# Patient Record
Sex: FEMALE | Race: WHITE | NOT HISPANIC OR LATINO | Employment: FULL TIME | RURAL
[De-identification: names, ages, dates, MRNs, and addresses within clinical notes are randomized per-mention and may not be internally consistent; named-entity substitution may affect disease eponyms.]

---

## 2017-12-09 ENCOUNTER — CONVERSION ENCOUNTER (OUTPATIENT)
Dept: FAMILY MEDICINE CLINIC | Facility: CLINIC | Age: 47
End: 2017-12-09

## 2017-12-10 LAB
ALBUMIN SERPL-MCNC: 4.4 G/DL (ref 3.6–5.1)
ALP SERPL-CCNC: 64 U/L (ref 33–115)
ALT SERPL-CCNC: 14 U/L (ref 6–29)
AST SERPL-CCNC: 19 U/L (ref 10–35)
BASOPHILS # BLD AUTO: 122 CELLS/UL (ref 0–200)
BASOPHILS NFR BLD AUTO: 1.2 %
BILIRUB SERPL-MCNC: 0.4 MG/DL (ref 0.2–1.2)
BUN SERPL-MCNC: 14 MG/DL (ref 7–25)
BUN/CREAT SERPL: 12 (CALC) (ref 6–22)
CALCIUM SERPL-MCNC: 9.9 MG/DL (ref 8.6–10.2)
CHLORIDE SERPL-SCNC: 104 MMOL/L (ref 98–110)
CHOLEST SERPL-MCNC: 196 MG/DL
CHOLEST/HDLC SERPL: 4.5 (CALC)
CONV CO2: 26 MMOL/L (ref 20–31)
CONV TOTAL PROTEIN: 8.1 G/DL (ref 6.1–8.1)
CREAT UR-MCNC: 1.18 MG/DL (ref 0.5–1.1)
EOSINOPHIL # BLD AUTO: 3.3 %
EOSINOPHIL # BLD AUTO: 337 CELLS/UL (ref 15–500)
ERYTHROCYTE [DISTWIDTH] IN BLOOD BY AUTOMATED COUNT: 14.6 % (ref 11–15)
GLOBULIN UR ELPH-MCNC: 3.7 MG/DL (ref 1.9–3.7)
GLUCOSE UR QL: 97 MG/DL (ref 65–99)
HCT VFR BLD AUTO: 38.2 % (ref 35–45)
HDLC SERPL-MCNC: 44 MG/DL
HGB BLD-MCNC: 12.3 G/DL (ref 11.7–15.5)
INSULIN SERPL-ACNC: 1.2 (CALC) (ref 1–2.5)
LDLC SERPL CALC-MCNC: 122 MG/DL
LYMPHOCYTES # BLD AUTO: 3295 CELLS/UL (ref 850–3900)
LYMPHOCYTES NFR BLD AUTO: 32.3 %
MCH RBC QN AUTO: 27.1 PG (ref 27–33)
MCHC RBC AUTO-ENTMCNC: 32.2 G/DL (ref 32–36)
MCV RBC AUTO: 84.1 FL (ref 80–100)
MONOCYTES # BLD AUTO: 908 CELLS/UL (ref 200–950)
MONOCYTES NFR BLD AUTO: 8.9 %
NEUTROPHILS # BLD AUTO: 5539 CELLS/UL (ref 1500–7800)
NEUTROPHILS NFR BLD AUTO: 54.3 %
NONHDLC SERPL-MCNC: 152 MG/DL
PLATELET # BLD AUTO: 409 10*3/UL (ref 140–400)
PMV BLD AUTO: 10.7 FL (ref 7.5–12.5)
POTASSIUM SERPL-SCNC: 4.8 MMOL/L (ref 3.5–5.3)
RBC # BLD AUTO: 4.54 MILLION/UL (ref 3.8–5.1)
SODIUM SERPL-SCNC: 138 MMOL/L (ref 135–146)
TRIGL SERPL-MCNC: 181 MG/DL
TSH SERPL-ACNC: 1.93 MIU/L
WBC # BLD AUTO: 10.2 10*3/UL (ref 3.8–10.8)

## 2018-02-17 ENCOUNTER — CONVERSION ENCOUNTER (OUTPATIENT)
Dept: FAMILY MEDICINE CLINIC | Facility: CLINIC | Age: 48
End: 2018-02-17

## 2018-02-18 LAB
ALBUMIN SERPL-MCNC: 4.2 G/DL (ref 3.6–5.1)
ALP SERPL-CCNC: 69 U/L (ref 33–115)
ALT SERPL-CCNC: 14 U/L (ref 6–29)
AST SERPL-CCNC: 15 U/L (ref 10–35)
BASOPHILS # BLD AUTO: 134 CELLS/UL (ref 0–200)
BASOPHILS NFR BLD AUTO: 1.2 %
BILIRUB SERPL-MCNC: 0.3 MG/DL (ref 0.2–1.2)
BUN SERPL-MCNC: 11 MG/DL (ref 7–25)
BUN/CREAT SERPL: NORMAL (CALC) (ref 6–22)
CALCIUM SERPL-MCNC: 9.7 MG/DL (ref 8.6–10.2)
CHLORIDE SERPL-SCNC: 103 MMOL/L (ref 98–110)
CONV CO2: 25 MMOL/L (ref 20–31)
CONV TOTAL PROTEIN: 7.8 G/DL (ref 6.1–8.1)
CREAT UR-MCNC: 0.91 MG/DL (ref 0.5–1.1)
EOSINOPHIL # BLD AUTO: 7.9 %
EOSINOPHIL # BLD AUTO: 885 CELLS/UL (ref 15–500)
ERYTHROCYTE [DISTWIDTH] IN BLOOD BY AUTOMATED COUNT: 14.7 % (ref 11–15)
GLOBULIN UR ELPH-MCNC: 3.6 MG/DL (ref 1.9–3.7)
GLUCOSE UR QL: 96 MG/DL (ref 65–99)
HCT VFR BLD AUTO: 38.6 % (ref 35–45)
HGB BLD-MCNC: 12.4 G/DL (ref 11.7–15.5)
INSULIN SERPL-ACNC: 1.2 (CALC) (ref 1–2.5)
LYMPHOCYTES # BLD AUTO: 4368 CELLS/UL (ref 850–3900)
LYMPHOCYTES NFR BLD AUTO: 39 %
MCH RBC QN AUTO: 27.3 PG (ref 27–33)
MCHC RBC AUTO-ENTMCNC: 32.1 G/DL (ref 32–36)
MCV RBC AUTO: 84.8 FL (ref 80–100)
MONOCYTES # BLD AUTO: 862 CELLS/UL (ref 200–950)
MONOCYTES NFR BLD AUTO: 7.7 %
NEUTROPHILS # BLD AUTO: 4950 CELLS/UL (ref 1500–7800)
NEUTROPHILS NFR BLD AUTO: 44.2 %
PLATELET # BLD AUTO: 377 10*3/UL (ref 140–400)
PMV BLD AUTO: 10.2 FL (ref 7.5–12.5)
POTASSIUM SERPL-SCNC: 4.4 MMOL/L (ref 3.5–5.3)
RBC # BLD AUTO: 4.55 MILLION/UL (ref 3.8–5.1)
SODIUM SERPL-SCNC: 138 MMOL/L (ref 135–146)
WBC # BLD AUTO: 11.2 10*3/UL (ref 3.8–10.8)

## 2019-03-23 ENCOUNTER — CONVERSION ENCOUNTER (OUTPATIENT)
Dept: FAMILY MEDICINE CLINIC | Facility: CLINIC | Age: 49
End: 2019-03-23

## 2019-03-23 LAB
BASOPHILS # BLD AUTO: 127 CELLS/UL (ref 0–200)
BASOPHILS NFR BLD AUTO: 1.3 %
CHOLEST SERPL-MCNC: 180 MG/DL
CHOLEST/HDLC SERPL: 4.2 (CALC)
EOSINOPHIL # BLD AUTO: 3.6 %
EOSINOPHIL # BLD AUTO: 353 CELLS/UL (ref 15–500)
ERYTHROCYTE [DISTWIDTH] IN BLOOD BY AUTOMATED COUNT: 13.6 % (ref 11–15)
HCT VFR BLD AUTO: 38.2 % (ref 35–45)
HDLC SERPL-MCNC: 43 MG/DL
HGB BLD-MCNC: 12.3 G/DL (ref 11.7–15.5)
LDLC SERPL CALC-MCNC: 104 MG/DL
LYMPHOCYTES # BLD AUTO: 4665 CELLS/UL (ref 850–3900)
LYMPHOCYTES NFR BLD AUTO: 47.6 %
MCH RBC QN AUTO: 28.1 PG (ref 27–33)
MCHC RBC AUTO-ENTMCNC: 32.2 G/DL (ref 32–36)
MCV RBC AUTO: 87.4 FL (ref 80–100)
MONOCYTES # BLD AUTO: 696 CELLS/UL (ref 200–950)
MONOCYTES NFR BLD AUTO: 7.1 %
NEUTROPHILS # BLD AUTO: 3959 CELLS/UL (ref 1500–7800)
NEUTROPHILS NFR BLD AUTO: 40.4 %
NONHDLC SERPL-MCNC: 137 MG/DL
PLATELET # BLD AUTO: 380 10*3/UL (ref 140–400)
PMV BLD AUTO: 10.4 FL (ref 7.5–12.5)
RBC # BLD AUTO: 4.37 MILLION/UL (ref 3.8–5.1)
TRIGL SERPL-MCNC: 209 MG/DL
WBC # BLD AUTO: 9.8 10*3/UL (ref 3.8–10.8)

## 2020-01-10 ENCOUNTER — OFFICE VISIT (OUTPATIENT)
Dept: FAMILY MEDICINE CLINIC | Facility: CLINIC | Age: 50
End: 2020-01-10

## 2020-01-10 VITALS
RESPIRATION RATE: 18 BRPM | HEART RATE: 86 BPM | OXYGEN SATURATION: 98 % | HEIGHT: 64 IN | DIASTOLIC BLOOD PRESSURE: 62 MMHG | SYSTOLIC BLOOD PRESSURE: 108 MMHG | WEIGHT: 168.2 LBS | TEMPERATURE: 98.5 F | BODY MASS INDEX: 28.71 KG/M2

## 2020-01-10 DIAGNOSIS — E66.3 OVERWEIGHT (BMI 25.0-29.9): ICD-10-CM

## 2020-01-10 DIAGNOSIS — Z12.4 CERVICAL CANCER SCREENING: ICD-10-CM

## 2020-01-10 DIAGNOSIS — Z12.39 ENCOUNTER FOR SCREENING FOR MALIGNANT NEOPLASM OF BREAST: ICD-10-CM

## 2020-01-10 DIAGNOSIS — J30.1 CHRONIC SEASONAL ALLERGIC RHINITIS DUE TO POLLEN: ICD-10-CM

## 2020-01-10 DIAGNOSIS — Z78.0 ASYMPTOMATIC MENOPAUSAL STATE: ICD-10-CM

## 2020-01-10 DIAGNOSIS — Z12.11 COLON CANCER SCREENING: ICD-10-CM

## 2020-01-10 DIAGNOSIS — I10 HYPERTENSION, BENIGN: ICD-10-CM

## 2020-01-10 DIAGNOSIS — Z00.01 ANNUAL VISIT FOR GENERAL ADULT MEDICAL EXAMINATION WITH ABNORMAL FINDINGS: Primary | ICD-10-CM

## 2020-01-10 DIAGNOSIS — E78.5 DYSLIPIDEMIA WITH ELEVATED LOW DENSITY LIPOPROTEIN (LDL) CHOLESTEROL AND ABNORMALLY LOW HIGH DENSITY LIPOPROTEIN CHOLESTEROL: ICD-10-CM

## 2020-01-10 DIAGNOSIS — R92.2 DENSE BREAST: ICD-10-CM

## 2020-01-10 DIAGNOSIS — R35.0 URINARY FREQUENCY: ICD-10-CM

## 2020-01-10 DIAGNOSIS — J45.20 MILD INTERMITTENT ASTHMA WITHOUT COMPLICATION: ICD-10-CM

## 2020-01-10 DIAGNOSIS — K21.9 GASTROESOPHAGEAL REFLUX DISEASE WITHOUT ESOPHAGITIS: ICD-10-CM

## 2020-01-10 LAB
BILIRUB BLD-MCNC: NEGATIVE MG/DL
CLARITY, POC: ABNORMAL
COLOR UR: ABNORMAL
GLUCOSE UR STRIP-MCNC: NEGATIVE MG/DL
KETONES UR QL: NEGATIVE
LEUKOCYTE EST, POC: ABNORMAL
NITRITE UR-MCNC: POSITIVE MG/ML
PH UR: 7 [PH] (ref 5–8)
PROT UR STRIP-MCNC: ABNORMAL MG/DL
RBC # UR STRIP: ABNORMAL /UL
SP GR UR: 1.02 (ref 1–1.03)
UROBILINOGEN UR QL: NORMAL

## 2020-01-10 PROCEDURE — 99396 PREV VISIT EST AGE 40-64: CPT | Performed by: FAMILY MEDICINE

## 2020-01-10 PROCEDURE — 81002 URINALYSIS NONAUTO W/O SCOPE: CPT | Performed by: FAMILY MEDICINE

## 2020-01-10 PROCEDURE — 99213 OFFICE O/P EST LOW 20 MIN: CPT | Performed by: FAMILY MEDICINE

## 2020-01-10 RX ORDER — CHLORAL HYDRATE 500 MG
1 CAPSULE ORAL 2 TIMES DAILY
COMMUNITY

## 2020-01-10 RX ORDER — MONTELUKAST SODIUM 10 MG/1
1 TABLET ORAL DAILY
COMMUNITY
Start: 2019-12-01 | End: 2020-01-10 | Stop reason: SDUPTHER

## 2020-01-10 RX ORDER — LORATADINE 10 MG/1
10 TABLET, ORALLY DISINTEGRATING ORAL DAILY
COMMUNITY

## 2020-01-10 RX ORDER — MONTELUKAST SODIUM 10 MG/1
10 TABLET ORAL DAILY
Qty: 90 TABLET | Refills: 3 | Status: SHIPPED | OUTPATIENT
Start: 2020-01-10 | End: 2021-02-15

## 2020-01-10 RX ORDER — SULFAMETHOXAZOLE AND TRIMETHOPRIM 800; 160 MG/1; MG/1
1 TABLET ORAL 2 TIMES DAILY
Qty: 20 TABLET | Refills: 0 | Status: SHIPPED | OUTPATIENT
Start: 2020-01-10 | End: 2020-03-04

## 2020-01-10 RX ORDER — ATENOLOL 25 MG/1
25 TABLET ORAL 2 TIMES DAILY
Qty: 90 TABLET | Refills: 3 | Status: SHIPPED | OUTPATIENT
Start: 2020-01-10 | End: 2020-08-24

## 2020-01-10 RX ORDER — OMEPRAZOLE 20 MG/1
20 CAPSULE, DELAYED RELEASE ORAL DAILY
Qty: 90 CAPSULE | Refills: 3 | Status: SHIPPED | OUTPATIENT
Start: 2020-01-10 | End: 2021-02-15

## 2020-01-10 RX ORDER — ASPIRIN 81 MG/1
81 TABLET ORAL DAILY
COMMUNITY

## 2020-01-10 RX ORDER — ALBUTEROL SULFATE 90 UG/1
2 AEROSOL, METERED RESPIRATORY (INHALATION) AS NEEDED
COMMUNITY
End: 2021-01-27 | Stop reason: SDUPTHER

## 2020-01-10 RX ORDER — ATENOLOL 25 MG/1
1 TABLET ORAL 2 TIMES DAILY
COMMUNITY
Start: 2019-12-01 | End: 2020-01-10 | Stop reason: SDUPTHER

## 2020-01-10 RX ORDER — OMEPRAZOLE 20 MG/1
1 CAPSULE, DELAYED RELEASE ORAL DAILY
COMMUNITY
Start: 2019-12-01 | End: 2020-01-10 | Stop reason: SDUPTHER

## 2020-01-10 NOTE — PROGRESS NOTES
Subjective   Nancy Marcano is a 49 y.o. female.     Chief Complaint   Patient presents with   • Annual Exam   • Urinary Tract Infection   • Hypertension   • Hyperlipidemia       The patient is here: for coordination of medical care to discuss health maintenance and disease prevention. Overall has: moderate activity with work/home activities, good appetite, feels well with no complaints, good energy level and is sleeping well. Nutrition: balanced diet. Last tetanus shot was 2016.     Urinary Frequency    This is a new problem. The current episode started in the past 7 days. The problem occurs every urination. The problem has been gradually worsening. The quality of the pain is described as aching. There has been no fever. Associated symptoms include frequency and urgency. Pertinent negatives include no chills, discharge, flank pain, nausea or vomiting. She has tried nothing for the symptoms. Her past medical history is significant for kidney stones.   Hypertension   This is a chronic problem. The current episode started more than 1 year ago. The problem is controlled. Pertinent negatives include no blurred vision, chest pain, headaches, palpitations or shortness of breath. Risk factors for coronary artery disease include dyslipidemia and family history. Current antihypertension treatment includes beta blockers. The current treatment provides significant improvement.   Hyperlipidemia   This is a chronic problem. The current episode started more than 1 year ago. The problem is uncontrolled. Recent lipid tests were reviewed and are high. She has no history of diabetes or obesity. Pertinent negatives include no chest pain, myalgias or shortness of breath. She is currently on no antihyperlipidemic treatment. Risk factors for coronary artery disease include dyslipidemia, hypertension and family history.        I personally reviewed and updated the patient's allergies, medications, problem list, and past medical,  surgical, social, and family history.     Allergies:  No Known Allergies    Social History:  Social History     Socioeconomic History   • Marital status:      Spouse name: Not on file   • Number of children: Not on file   • Years of education: Not on file   • Highest education level: Not on file   Tobacco Use   • Smoking status: Never Smoker   • Smokeless tobacco: Never Used   Substance and Sexual Activity   • Alcohol use: Not Currently   • Drug use: Not Currently   • Sexual activity: Defer       Family History:  Family History   Problem Relation Age of Onset   • Heart disease Mother         Cardiovascular    • Hyperlipidemia Mother    • Lupus Mother         Lupus anticoagulant disorder   • Heart disease Father         Cardiovascular    • Hyperlipidemia Father    • Diabetes Paternal Grandmother    • Diabetes Paternal Aunt        Past Medical History :  Patient Active Problem List   Diagnosis   • Chronic seasonal allergic rhinitis due to pollen   • Dense breast   • Dyslipidemia with elevated low density lipoprotein (LDL) cholesterol and abnormally low high density lipoprotein cholesterol   • Gastroesophageal reflux disease without esophagitis   • Hypertension, benign   • Menopausal syndrome   • Mild intermittent asthma without complication   • Overweight (BMI 25.0-29.9)   • Annual visit for general adult medical examination with abnormal findings   • Encounter for screening for malignant neoplasm of breast   • Cervical cancer screening   • Asymptomatic menopausal state   • Colon cancer screening       Medication List:    Current Outpatient Medications:   •  albuterol sulfate  (90 Base) MCG/ACT inhaler, Inhale 2 puffs As Needed., Disp: , Rfl:   •  aspirin 81 MG EC tablet, Take 81 mg by mouth Daily., Disp: , Rfl:   •  atenolol (TENORMIN) 25 MG tablet, Take 1 tablet by mouth 2 (Two) Times a Day., Disp: 90 tablet, Rfl: 3  •  loratadine (CLARITIN REDITABS) 10 MG disintegrating tablet, Take 10 mg by mouth  Daily., Disp: , Rfl:   •  montelukast (SINGULAIR) 10 MG tablet, Take 1 tablet by mouth Daily., Disp: 90 tablet, Rfl: 3  •  Omega-3 Fatty Acids (FISH OIL) 1000 MG capsule capsule, Take 1 capsule by mouth 2 (Two) Times a Day., Disp: , Rfl:   •  omeprazole (priLOSEC) 20 MG capsule, Take 1 capsule by mouth Daily., Disp: 90 capsule, Rfl: 3  •  sulfamethoxazole-trimethoprim (BACTRIM DS,SEPTRA DS) 800-160 MG per tablet, Take 1 tablet by mouth 2 (Two) Times a Day., Disp: 20 tablet, Rfl: 0    Past Surgical History:  Past Surgical History:   Procedure Laterality Date   • CATARACT EXTRACTION Left 08/28/2017    [facility]   • CATARACT EXTRACTION Right 2018    [facility]   • KIDNEY SURGERY      Lithotripsy       Depression Screen:   PHQ-2/PHQ-9 Depression Screening 1/10/2020   Little interest or pleasure in doing things 0   Feeling down, depressed, or hopeless 0   Total Score 0       Review Of Systems:  Review of Systems   Constitutional: Negative for activity change, appetite change, chills, fatigue, fever, unexpected weight gain and unexpected weight loss.   HENT: Negative for congestion, ear pain, hearing loss, rhinorrhea, sinus pressure, sore throat, swollen glands, trouble swallowing and voice change.    Eyes: Negative for blurred vision and visual disturbance.   Respiratory: Negative for apnea, cough, shortness of breath and wheezing.    Cardiovascular: Negative for chest pain and palpitations.   Gastrointestinal: Negative for abdominal pain, blood in stool, constipation, diarrhea, nausea, vomiting and indigestion.   Endocrine: Negative for cold intolerance, heat intolerance, polydipsia and polyuria.   Genitourinary: Positive for frequency and urgency. Negative for breast discharge, breast lump, breast pain, dysuria, flank pain, pelvic pain and vaginal bleeding.   Musculoskeletal: Negative for arthralgias, joint swelling and myalgias.   Skin: Negative for rash, skin lesions and bruise.   Allergic/Immunologic:  "Negative for environmental allergies and immunocompromised state.   Neurological: Negative for dizziness, syncope, weakness, numbness, headache and memory problem.   Hematological: Negative for adenopathy. Does not bruise/bleed easily.   Psychiatric/Behavioral: Negative for suicidal ideas and depressed mood. The patient is not nervous/anxious.        I have reviewed and confirmed the accuracy of the ROS as documented by the MA/LPN/RN Daly Galeano MD    Vital Signs:  Visit Vitals  /62 (BP Location: Right arm, Patient Position: Sitting, Cuff Size: Adult)   Pulse 86   Temp 98.5 °F (36.9 °C) (Oral)   Resp 18   Ht 162.6 cm (64\")   Wt 76.3 kg (168 lb 3.2 oz)   LMP 01/07/2020   SpO2 98% Comment: Room air   BMI 28.87 kg/m²       Physical Exam   Constitutional: She is oriented to person, place, and time. She appears well-developed and well-nourished. No distress.   HENT:   Head: Normocephalic. Hair is normal.   Right Ear: Tympanic membrane and external ear normal.   Left Ear: Tympanic membrane and external ear normal.   Nose: Nose normal. No mucosal edema.   Mouth/Throat: Uvula is midline, oropharynx is clear and moist and mucous membranes are normal.   Eyes: Pupils are equal, round, and reactive to light. Conjunctivae and EOM are normal. Right eye exhibits no discharge. Left eye exhibits no discharge.   Neck: Normal range of motion. Neck supple. No JVD present. No muscular tenderness present. No thyromegaly present.   Cardiovascular: Normal rate, regular rhythm and normal heart sounds. PMI is not displaced. Exam reveals no gallop and no friction rub.   No murmur heard.  Pulses:       Carotid pulses are 2+ on the right side, and 2+ on the left side.       Femoral pulses are 2+ on the right side, and 2+ on the left side.       Dorsalis pedis pulses are 2+ on the right side, and 2+ on the left side.   Pulmonary/Chest: Effort normal and breath sounds normal. No respiratory distress. She has no decreased breath " sounds. She has no wheezes. She has no rhonchi. She has no rales. Right breast exhibits no inverted nipple, no mass, no nipple discharge, no skin change and no tenderness. Left breast exhibits no inverted nipple, no mass, no nipple discharge, no skin change and no tenderness. No breast bleeding. Breasts are symmetrical.   Abdominal: Soft. Bowel sounds are normal. She exhibits no distension, no abdominal bruit and no mass. There is no hepatosplenomegaly. There is no CVA tenderness. Hernia confirmed negative in the right inguinal area and confirmed negative in the left inguinal area.   Musculoskeletal: Normal range of motion. She exhibits no edema, tenderness or deformity.   Lymphadenopathy:     She has no cervical adenopathy.        Right: No inguinal and no supraclavicular adenopathy present.        Left: No inguinal and no supraclavicular adenopathy present.   Neurological: She is alert and oriented to person, place, and time. She has normal strength and normal reflexes. No cranial nerve deficit or sensory deficit. She exhibits normal muscle tone. Coordination normal.   Skin: No ecchymosis, no lesion and no rash noted. No erythema.   Psychiatric: She has a normal mood and affect. Her speech is normal and behavior is normal. Judgment and thought content normal. Cognition and memory are normal. She does not express impulsivity. She expresses no suicidal ideation. She exhibits normal recent memory and normal remote memory.       Assessment and Plan:  Problem List Items Addressed This Visit        Unprioritized    Chronic seasonal allergic rhinitis due to pollen    Relevant Medications    montelukast (SINGULAIR) 10 MG tablet    Dense breast    Overview     Nancy Marcano's 5 year risk of having breast cancer is 0.9%. The average woman at age 49 y.o. has a risk of 1.3% of having breast cancer.  Nancy Marcano's life time risk of having breast cancer up to age 90 is 8.8%. The average woman's chance of  having breast cancer up to the age of 90 is 11.2%.    We discussed the risk assessment values with Nancy Marcano, she understands that she is at (less) than average risk of developing breast cancer at 5 years and over her life time than the average woman of her age. She does not desire to have genetic testing or further work up at this time.          Dyslipidemia with elevated low density lipoprotein (LDL) cholesterol and abnormally low high density lipoprotein cholesterol    Overview     Stable diet and exercise discussed.          Relevant Orders    Lipid Panel With / Chol / HDL Ratio (Completed)    Gastroesophageal reflux disease without esophagitis    Overview     Stable on meds.          Relevant Medications    omeprazole (priLOSEC) 20 MG capsule    Hypertension, benign    Overview     Controlled.          Current Assessment & Plan     Hypertension is improving with treatment.  Continue current treatment regimen.  Dietary sodium restriction.  Weight loss.  Regular aerobic exercise.  Blood pressure will be reassessed at the next regular appointment.         Relevant Medications    atenolol (TENORMIN) 25 MG tablet    Other Relevant Orders    CBC & Differential (Completed)    Comprehensive Metabolic Panel (Completed)    TSH (Completed)    Mild intermittent asthma without complication    Overview     Sxs are presently stable with prn meds.          Relevant Medications    albuterol sulfate  (90 Base) MCG/ACT inhaler    montelukast (SINGULAIR) 10 MG tablet    Overweight (BMI 25.0-29.9)    Overview     Diet exercise and wt loss encouraged.          Current Assessment & Plan     Obesity is improving with lifestyle modifications.  Discussed the patient's BMI.  The BMI is above average; BMI management plan is completed.  General weight loss/lifestyle modification strategies discussed (elicit support from others; identify saboteurs; non-food rewards, etc).  Informal exercise measures discussed, e.g.  taking stairs instead of elevator.  Regular aerobic exercise program discussed.         Annual visit for general adult medical examination with abnormal findings - Primary    Overview     Diet exercise wt loss, sunscreens seat belts, breast self exams and follow up in 1 yr or prn problems. Previous lab was discussed. We will notify her of today's lab.          Encounter for screening for malignant neoplasm of breast    Overview     Last mammogram 05/2019 Dr. Ryan Lagos         Cervical cancer screening    Overview     Last pap smear 05/2019 Dr. Davis         Asymptomatic menopausal state    Colon cancer screening    Overview     Accepted GSI packet           Other Visit Diagnoses     Urinary frequency        Relevant Orders    POCT urinalysis dipstick, manual (Completed)    Urine Culture - Urine, Urine, Clean Catch          An After Visit Summary and PPPS were given to the patient.

## 2020-01-11 LAB
ALBUMIN SERPL-MCNC: 4.4 G/DL (ref 3.5–5.5)
ALBUMIN/GLOB SERPL: 1.6 {RATIO} (ref 1.2–2.2)
ALP SERPL-CCNC: 97 IU/L (ref 39–117)
ALT SERPL-CCNC: 17 IU/L (ref 0–32)
AST SERPL-CCNC: 19 IU/L (ref 0–40)
BASOPHILS # BLD AUTO: 0.1 X10E3/UL (ref 0–0.2)
BASOPHILS NFR BLD AUTO: 1 %
BILIRUB SERPL-MCNC: 0.4 MG/DL (ref 0–1.2)
BUN SERPL-MCNC: 10 MG/DL (ref 6–24)
BUN/CREAT SERPL: 10 (ref 9–23)
CALCIUM SERPL-MCNC: 9.4 MG/DL (ref 8.7–10.2)
CHLORIDE SERPL-SCNC: 102 MMOL/L (ref 96–106)
CHOLEST SERPL-MCNC: 201 MG/DL (ref 100–199)
CHOLEST/HDLC SERPL: 4.2 RATIO (ref 0–4.4)
CO2 SERPL-SCNC: 24 MMOL/L (ref 20–29)
CREAT SERPL-MCNC: 0.98 MG/DL (ref 0.57–1)
EOSINOPHIL # BLD AUTO: 0.3 X10E3/UL (ref 0–0.4)
EOSINOPHIL NFR BLD AUTO: 3 %
ERYTHROCYTE [DISTWIDTH] IN BLOOD BY AUTOMATED COUNT: 15.2 % (ref 11.7–15.4)
GLOBULIN SER CALC-MCNC: 2.7 G/DL (ref 1.5–4.5)
GLUCOSE SERPL-MCNC: 94 MG/DL (ref 65–99)
HCT VFR BLD AUTO: 37.9 % (ref 34–46.6)
HDLC SERPL-MCNC: 48 MG/DL
HGB BLD-MCNC: 12.3 G/DL (ref 11.1–15.9)
IMM GRANULOCYTES # BLD AUTO: 0.1 X10E3/UL (ref 0–0.1)
IMM GRANULOCYTES NFR BLD AUTO: 1 %
LDLC SERPL CALC-MCNC: 127 MG/DL (ref 0–99)
LYMPHOCYTES # BLD AUTO: 3.5 X10E3/UL (ref 0.7–3.1)
LYMPHOCYTES NFR BLD AUTO: 29 %
MCH RBC QN AUTO: 27.4 PG (ref 26.6–33)
MCHC RBC AUTO-ENTMCNC: 32.5 G/DL (ref 31.5–35.7)
MCV RBC AUTO: 84 FL (ref 79–97)
MONOCYTES # BLD AUTO: 1.3 X10E3/UL (ref 0.1–0.9)
MONOCYTES NFR BLD AUTO: 11 %
NEUTROPHILS # BLD AUTO: 6.6 X10E3/UL (ref 1.4–7)
NEUTROPHILS NFR BLD AUTO: 55 %
PLATELET # BLD AUTO: 378 X10E3/UL (ref 150–450)
POTASSIUM SERPL-SCNC: 4.6 MMOL/L (ref 3.5–5.2)
PROT SERPL-MCNC: 7.1 G/DL (ref 6–8.5)
RBC # BLD AUTO: 4.49 X10E6/UL (ref 3.77–5.28)
SODIUM SERPL-SCNC: 140 MMOL/L (ref 134–144)
TRIGL SERPL-MCNC: 131 MG/DL (ref 0–149)
TSH SERPL DL<=0.005 MIU/L-ACNC: 1.37 UIU/ML (ref 0.45–4.5)
VLDLC SERPL CALC-MCNC: 26 MG/DL (ref 5–40)
WBC # BLD AUTO: 11.9 X10E3/UL (ref 3.4–10.8)

## 2020-01-13 LAB
BACTERIA UR CULT: ABNORMAL
BACTERIA UR CULT: ABNORMAL
OTHER ANTIBIOTIC SUSC ISLT: ABNORMAL

## 2020-03-04 ENCOUNTER — OFFICE VISIT (OUTPATIENT)
Dept: FAMILY MEDICINE CLINIC | Facility: CLINIC | Age: 50
End: 2020-03-04

## 2020-03-04 VITALS
TEMPERATURE: 98.3 F | SYSTOLIC BLOOD PRESSURE: 140 MMHG | DIASTOLIC BLOOD PRESSURE: 80 MMHG | WEIGHT: 165.8 LBS | OXYGEN SATURATION: 98 % | RESPIRATION RATE: 18 BRPM | BODY MASS INDEX: 28.31 KG/M2 | HEART RATE: 83 BPM | HEIGHT: 64 IN

## 2020-03-04 DIAGNOSIS — E66.3 OVERWEIGHT (BMI 25.0-29.9): ICD-10-CM

## 2020-03-04 DIAGNOSIS — R05.9 COUGH: Primary | ICD-10-CM

## 2020-03-04 DIAGNOSIS — N92.6 IRREGULAR MENSES: ICD-10-CM

## 2020-03-04 DIAGNOSIS — B34.9 VIRAL SYNDROME: ICD-10-CM

## 2020-03-04 DIAGNOSIS — I10 HYPERTENSION, BENIGN: ICD-10-CM

## 2020-03-04 PROCEDURE — 99214 OFFICE O/P EST MOD 30 MIN: CPT | Performed by: FAMILY MEDICINE

## 2020-03-04 RX ORDER — BENZONATATE 100 MG/1
100 CAPSULE ORAL 3 TIMES DAILY PRN
Qty: 30 CAPSULE | Refills: 0 | Status: SHIPPED | OUTPATIENT
Start: 2020-03-04 | End: 2020-09-22

## 2020-03-04 RX ORDER — BENZONATATE 100 MG/1
100 CAPSULE ORAL 3 TIMES DAILY PRN
Qty: 30 CAPSULE | Refills: 0 | Status: SHIPPED | OUTPATIENT
Start: 2020-03-04 | End: 2020-03-04

## 2020-03-04 RX ORDER — NORETHINDRONE ACETATE AND ETHINYL ESTRADIOL 1MG-20(21)
1 KIT ORAL DAILY
Qty: 28 TABLET | Refills: 12 | Status: SHIPPED | OUTPATIENT
Start: 2020-03-04 | End: 2020-09-22

## 2020-03-04 NOTE — PROGRESS NOTES
Subjective   Nancy Marcano is a 49 y.o. female.     Chief Complaint   Patient presents with   • Cough       Cough   This is a new problem. The current episode started 1 to 4 weeks ago. The problem has been unchanged. The cough is non-productive. Associated symptoms include nasal congestion and a sore throat. Pertinent negatives include no chest pain, chills, ear congestion, ear pain, headaches, rhinorrhea, shortness of breath or sweats. Nothing aggravates the symptoms. The treatment provided no relief.   Hypertension   This is a chronic problem. The current episode started more than 1 year ago. The problem is uncontrolled. Pertinent negatives include no chest pain, headaches, orthopnea, palpitations, peripheral edema, PND, shortness of breath or sweats. Risk factors for coronary artery disease include dyslipidemia. Current antihypertension treatment includes beta blockers. The current treatment provides moderate improvement.        The following portions of the patient's history were reviewed and updated as appropriate: allergies, current medications, past family history, past medical history, past social history, past surgical history and problem list.    Allergies:  No Known Allergies    Social History:  Social History     Socioeconomic History   • Marital status:      Spouse name: Not on file   • Number of children: Not on file   • Years of education: Not on file   • Highest education level: Not on file   Tobacco Use   • Smoking status: Never Smoker   • Smokeless tobacco: Never Used   Substance and Sexual Activity   • Alcohol use: Not Currently   • Drug use: Not Currently   • Sexual activity: Defer       Family History:  Family History   Problem Relation Age of Onset   • Heart disease Mother         Cardiovascular    • Hyperlipidemia Mother    • Lupus Mother         Lupus anticoagulant disorder   • Heart disease Father         Cardiovascular    • Hyperlipidemia Father    • Diabetes Paternal  Grandmother    • Diabetes Paternal Aunt        Past Medical History :  Patient Active Problem List   Diagnosis   • Chronic seasonal allergic rhinitis due to pollen   • Dense breast   • Dyslipidemia with elevated low density lipoprotein (LDL) cholesterol and abnormally low high density lipoprotein cholesterol   • Gastroesophageal reflux disease without esophagitis   • Hypertension, benign   • Menopausal syndrome   • Mild intermittent asthma without complication   • Overweight (BMI 25.0-29.9)   • Annual visit for general adult medical examination with abnormal findings   • Encounter for screening for malignant neoplasm of breast   • Cervical cancer screening   • Asymptomatic menopausal state   • Colon cancer screening   • Irregular menses       Medication List:  Outpatient Encounter Medications as of 3/4/2020   Medication Sig Dispense Refill   • albuterol sulfate  (90 Base) MCG/ACT inhaler Inhale 2 puffs As Needed.     • aspirin 81 MG EC tablet Take 81 mg by mouth Daily.     • atenolol (TENORMIN) 25 MG tablet Take 1 tablet by mouth 2 (Two) Times a Day. 90 tablet 3   • loratadine (CLARITIN REDITABS) 10 MG disintegrating tablet Take 10 mg by mouth Daily.     • montelukast (SINGULAIR) 10 MG tablet Take 1 tablet by mouth Daily. 90 tablet 3   • Omega-3 Fatty Acids (FISH OIL) 1000 MG capsule capsule Take 1 capsule by mouth 2 (Two) Times a Day.     • omeprazole (priLOSEC) 20 MG capsule Take 1 capsule by mouth Daily. 90 capsule 3   • benzonatate (TESSALON) 100 MG capsule Take 1 capsule by mouth 3 (Three) Times a Day As Needed for Cough. 30 capsule 0   • HYDROcodone-homatropine (HYDROMET) 5-1.5 MG/5ML syrup Take 5 mL by mouth Every 6 (Six) Hours As Needed for Cough. 120 mL 0   • norethindrone-ethinyl estradiol FE (JUNEL FE 1/20) 1-20 MG-MCG per tablet Take 1 tablet by mouth Daily. 28 tablet 12   • [DISCONTINUED] benzonatate (TESSALON) 100 MG capsule Take 1 capsule by mouth 3 (Three) Times a Day As Needed for Cough. 30  "capsule 0   • [DISCONTINUED] HYDROcodone-homatropine (HYDROMET) 5-1.5 MG/5ML syrup Take 5 mL by mouth Every 6 (Six) Hours As Needed for Cough. 120 mL 0   • [DISCONTINUED] sulfamethoxazole-trimethoprim (BACTRIM DS,SEPTRA DS) 800-160 MG per tablet Take 1 tablet by mouth 2 (Two) Times a Day. 20 tablet 0     No facility-administered encounter medications on file as of 3/4/2020.        Past Surgical History:  Past Surgical History:   Procedure Laterality Date   • CATARACT EXTRACTION Left 08/28/2017    [facility]   • CATARACT EXTRACTION Right 2018    [facility]   • KIDNEY SURGERY      Lithotripsy       Review of Systems:  Review of Systems   Constitutional: Negative for chills.   HENT: Positive for sore throat. Negative for ear pain and rhinorrhea.    Respiratory: Positive for cough. Negative for shortness of breath.    Cardiovascular: Negative for chest pain, palpitations, orthopnea and PND.   Genitourinary: Positive for vaginal bleeding (moderate after stopping OCP's her periods are also heaving and  slightly irregular. ). Negative for dysuria, vaginal discharge and vaginal pain.   Neurological: Negative for weakness, numbness and headache.       I have reviewed and confirmed the accuracy of the ROS as documented by the MA/LPN/RN Daly Galeano MD    Vital Signs:  Visit Vitals  /80   Pulse 83   Temp 98.3 °F (36.8 °C) (Oral)   Resp 18   Ht 162.6 cm (64\")   Wt 75.2 kg (165 lb 12.8 oz)   LMP 02/16/2020   SpO2 98% Comment: Room air   BMI 28.46 kg/m²       Physical Exam   Constitutional: She is oriented to person, place, and time. She appears well-developed and well-nourished. No distress.   HENT:   Right Ear: Tympanic membrane and external ear normal.   Left Ear: Tympanic membrane and external ear normal.   Mouth/Throat: Oropharynx is clear and moist.   Eyes: Conjunctivae are normal.   Neck: Neck supple. No JVD present. No thyromegaly present.   Cardiovascular: Normal rate, regular rhythm, normal heart " sounds and intact distal pulses. Exam reveals no gallop and no friction rub.   No murmur heard.  Pulmonary/Chest: Effort normal and breath sounds normal. No respiratory distress. She has no wheezes. She has no rales.   Abdominal: Soft. Bowel sounds are normal. She exhibits no distension and no mass. There is no tenderness.   Musculoskeletal: She exhibits no edema.   Lymphadenopathy:     She has no cervical adenopathy.   Neurological: She is alert and oriented to person, place, and time. Coordination normal.   Skin: Skin is warm. No rash noted. No erythema.   Psychiatric: She has a normal mood and affect.   Vitals reviewed.      Assessment and Plan:  Problem List Items Addressed This Visit        Unprioritized    Hypertension, benign    Overview     Controlled.          Overweight (BMI 25.0-29.9)    Overview     Diet exercise and wt loss encouraged.          Irregular menses    Overview     Resume OCP's and follow         Relevant Medications    norethindrone-ethinyl estradiol FE (JUNEL FE 1/20) 1-20 MG-MCG per tablet      Other Visit Diagnoses     Cough    -  Primary    Cough meds fluids and follow    Relevant Medications    HYDROcodone-homatropine (HYDROMET) 5-1.5 MG/5ML syrup    benzonatate (TESSALON) 100 MG capsule    Viral syndrome        Fluids rest saline nose drops and follow up should sxs not improve over 48 hrs and resolve over 1 week.           An After Visit Summary and PPPS were given to the patient.

## 2020-08-20 DIAGNOSIS — I10 HYPERTENSION, BENIGN: ICD-10-CM

## 2020-08-24 RX ORDER — ATENOLOL 25 MG/1
25 TABLET ORAL 2 TIMES DAILY
Qty: 180 TABLET | Refills: 1 | Status: SHIPPED | OUTPATIENT
Start: 2020-08-24 | End: 2021-02-11

## 2020-09-22 ENCOUNTER — OFFICE VISIT (OUTPATIENT)
Dept: FAMILY MEDICINE CLINIC | Facility: CLINIC | Age: 50
End: 2020-09-22

## 2020-09-22 VITALS
HEIGHT: 64 IN | BODY MASS INDEX: 28.48 KG/M2 | WEIGHT: 166.8 LBS | DIASTOLIC BLOOD PRESSURE: 68 MMHG | OXYGEN SATURATION: 99 % | SYSTOLIC BLOOD PRESSURE: 138 MMHG | HEART RATE: 90 BPM | TEMPERATURE: 98 F | RESPIRATION RATE: 18 BRPM

## 2020-09-22 DIAGNOSIS — I10 HYPERTENSION, BENIGN: ICD-10-CM

## 2020-09-22 DIAGNOSIS — E66.3 OVERWEIGHT WITH BODY MASS INDEX (BMI) OF 28 TO 28.9 IN ADULT: ICD-10-CM

## 2020-09-22 DIAGNOSIS — N92.6 MENSTRUAL IRREGULARITY: Primary | ICD-10-CM

## 2020-09-22 DIAGNOSIS — E78.5 DYSLIPIDEMIA WITH ELEVATED LOW DENSITY LIPOPROTEIN (LDL) CHOLESTEROL AND ABNORMALLY LOW HIGH DENSITY LIPOPROTEIN CHOLESTEROL: ICD-10-CM

## 2020-09-22 LAB
B-HCG UR QL: NEGATIVE
BILIRUB BLD-MCNC: NEGATIVE MG/DL
CLARITY, POC: CLEAR
COLOR UR: YELLOW
GLUCOSE UR STRIP-MCNC: NEGATIVE MG/DL
INTERNAL NEGATIVE CONTROL: NEGATIVE
INTERNAL POSITIVE CONTROL: POSITIVE
KETONES UR QL: NEGATIVE
LEUKOCYTE EST, POC: NEGATIVE
Lab: NORMAL
NITRITE UR-MCNC: NEGATIVE MG/ML
PH UR: 6 [PH] (ref 5–8)
PROT UR STRIP-MCNC: NEGATIVE MG/DL
RBC # UR STRIP: ABNORMAL /UL
SP GR UR: 1.01 (ref 1–1.03)
UROBILINOGEN UR QL: NORMAL

## 2020-09-22 PROCEDURE — 81002 URINALYSIS NONAUTO W/O SCOPE: CPT | Performed by: FAMILY MEDICINE

## 2020-09-22 PROCEDURE — 81025 URINE PREGNANCY TEST: CPT | Performed by: FAMILY MEDICINE

## 2020-09-22 PROCEDURE — 36415 COLL VENOUS BLD VENIPUNCTURE: CPT | Performed by: FAMILY MEDICINE

## 2020-09-22 PROCEDURE — 99214 OFFICE O/P EST MOD 30 MIN: CPT | Performed by: FAMILY MEDICINE

## 2020-09-22 RX ORDER — NORGESTIMATE AND ETHINYL ESTRADIOL 0.25-0.035
1 KIT ORAL DAILY
Qty: 1 PACKAGE | Refills: 12 | Status: SHIPPED | OUTPATIENT
Start: 2020-09-22 | End: 2021-03-03

## 2020-09-22 NOTE — PROGRESS NOTES
Subjective   Nancy Marcano is a 49 y.o. female.     Chief Complaint   Patient presents with   • Menstrual irregularity       Nancy Marcano declines flu vaccine. The protection afforded vs the risk of life threatening secondary infection was explained. She is encouraged to reconsider.    Menstrual Irregularity:  Nancy Marcano is here today with complaints of heavy menses. She is -1, Parity-1. LMP-2020. Nancy has been bleeding for the past six weeks without cease. She is premenopausal. Onset was 6 months ago. Nancy was advised to discontinue her birth control a year ago by Dr. Davis- gynecologist and Dr. Galeano. She continued to have menstrual periods after stopping birth control. Dr. Galeano advised to continue taking birth control on 2020. Onset followed by taking oral contraceptives continuously x 1 mo.  Associated symptoms include abdominal cramps, dizziness, nausea and low back pain Pertinent surgical history includes none. She is       Dizziness  This is a new problem. The current episode started more than 1 month ago. The problem occurs intermittently. The problem has been gradually worsening. Associated symptoms include nausea and vertigo. Pertinent negatives include no abdominal pain, arthralgias, chest pain, chills, congestion, coughing, fatigue, fever, headaches, myalgias or sore throat. Associated symptoms comments: Nancy reports that when she bends over and then stands- she becomes very dizzy and nauseous.. Nothing aggravates the symptoms. She has tried nothing for the symptoms. The treatment provided no relief.   Hypertension  This is a chronic problem. The current episode started more than 1 year ago. The problem is controlled. Pertinent negatives include no chest pain, headaches, orthopnea, palpitations, peripheral edema, PND, shortness of breath or sweats. Risk factors for coronary artery disease include dyslipidemia and family history.  Current antihypertension treatment includes beta blockers. The current treatment provides significant improvement.   Hyperlipidemia  This is a chronic problem. The current episode started more than 1 year ago. The problem is controlled. Recent lipid tests were reviewed and are high. She has no history of diabetes or obesity. Pertinent negatives include no chest pain, myalgias or shortness of breath. Current antihyperlipidemic treatment includes herbal therapy. The current treatment provides moderate improvement of lipids. Risk factors for coronary artery disease include dyslipidemia, hypertension and family history.        The following portions of the patient's history were reviewed and updated as appropriate: allergies, current medications, past family history, past medical history, past social history, past surgical history and problem list.    Allergies:  No Known Allergies    Social History:  Social History     Socioeconomic History   • Marital status:      Spouse name: Not on file   • Number of children: Not on file   • Years of education: Not on file   • Highest education level: Not on file   Tobacco Use   • Smoking status: Never Smoker   • Smokeless tobacco: Never Used   Substance and Sexual Activity   • Alcohol use: Not Currently   • Drug use: Not Currently   • Sexual activity: Defer       Family History:  Family History   Problem Relation Age of Onset   • Heart disease Mother         Cardiovascular    • Hyperlipidemia Mother    • Crohn's disease Mother    • Heart disease Father         Cardiovascular    • Hyperlipidemia Father    • Diabetes Paternal Grandmother    • Diabetes Paternal Aunt        Past Medical History :  Patient Active Problem List   Diagnosis   • Chronic seasonal allergic rhinitis due to pollen   • Dense breast   • Dyslipidemia with elevated low density lipoprotein (LDL) cholesterol and abnormally low high density lipoprotein cholesterol   • Gastroesophageal reflux disease without  esophagitis   • Hypertension, benign   • Menopausal syndrome   • Mild intermittent asthma without complication   • Overweight with body mass index (BMI) of 28 to 28.9 in adult   • Annual visit for general adult medical examination with abnormal findings   • Encounter for screening for malignant neoplasm of breast   • Cervical cancer screening   • Asymptomatic menopausal state   • Colon cancer screening   • Irregular menses       Medication List:  Outpatient Encounter Medications as of 9/22/2020   Medication Sig Dispense Refill   • albuterol sulfate  (90 Base) MCG/ACT inhaler Inhale 2 puffs As Needed.     • aspirin 81 MG EC tablet Take 81 mg by mouth Daily.     • atenolol (TENORMIN) 25 MG tablet Take 1 tablet by mouth 2 (Two) Times a Day. 180 tablet 1   • loratadine (CLARITIN REDITABS) 10 MG disintegrating tablet Take 10 mg by mouth Daily.     • montelukast (SINGULAIR) 10 MG tablet Take 1 tablet by mouth Daily. 90 tablet 3   • Omega-3 Fatty Acids (FISH OIL) 1000 MG capsule capsule Take 1 capsule by mouth 2 (Two) Times a Day.     • omeprazole (priLOSEC) 20 MG capsule Take 1 capsule by mouth Daily. 90 capsule 3   • [DISCONTINUED] norethindrone-ethinyl estradiol FE (JUNEL FE 1/20) 1-20 MG-MCG per tablet Take 1 tablet by mouth Daily. 28 tablet 12   • norgestimate-ethinyl estradiol (Ortho-Cyclen, 28,) 0.25-35 MG-MCG per tablet Take 1 tablet by mouth Daily. 2 tab daily until no bleeding then 1 daily thereafter 1 package 12   • [DISCONTINUED] benzonatate (TESSALON) 100 MG capsule Take 1 capsule by mouth 3 (Three) Times a Day As Needed for Cough. 30 capsule 0   • [DISCONTINUED] HYDROcodone-homatropine (HYDROMET) 5-1.5 MG/5ML syrup Take 5 mL by mouth Every 6 (Six) Hours As Needed for Cough. 120 mL 0     No facility-administered encounter medications on file as of 9/22/2020.        Past Surgical History:  Past Surgical History:   Procedure Laterality Date   • CATARACT EXTRACTION Left 08/28/2017    [facility]   •  "CATARACT EXTRACTION Right 2018    [facility]   • KIDNEY SURGERY      Lithotripsy       Review of Systems:  Review of Systems   Constitutional: Negative for chills, fatigue and fever.   HENT: Negative for congestion and sore throat.    Respiratory: Negative for cough and shortness of breath.    Cardiovascular: Negative for chest pain, palpitations, orthopnea and PND.   Gastrointestinal: Positive for nausea. Negative for abdominal pain.   Endocrine: Negative for cold intolerance, heat intolerance, polydipsia and polyuria.   Genitourinary: Positive for menstrual problem (She reports heavy bleeding x 2 mos. ). Negative for dysuria, pelvic pain and pelvic pressure.   Musculoskeletal: Negative for arthralgias and myalgias.   Neurological: Positive for dizziness ( mostly when she bends over ) and vertigo.       I have reviewed and confirmed the accuracy of the HPI and ROS as documented by the MA/LPN/RN Daly Galeano MD    Vital Signs:  Visit Vitals  /68 (BP Location: Right arm, Patient Position: Sitting, Cuff Size: Adult)   Pulse 90   Temp 98 °F (36.7 °C) (Temporal)   Resp 18   Ht 162.6 cm (64\")   Wt 75.7 kg (166 lb 12.8 oz)   LMP 08/09/2020   SpO2 99% Comment: Room air   BMI 28.63 kg/m²       Physical Exam  Constitutional:       General: She is not in acute distress.     Appearance: She is normal weight.   Neck:      Musculoskeletal: Neck supple.      Vascular: No carotid bruit or JVD.   Cardiovascular:      Rate and Rhythm: Normal rate and regular rhythm.      Pulses: Normal pulses.      Heart sounds: No murmur. No gallop.    Pulmonary:      Effort: Pulmonary effort is normal.      Breath sounds: Normal breath sounds. No wheezing.   Abdominal:      General: Abdomen is flat. Bowel sounds are normal. There is no distension.      Palpations: Abdomen is soft. There is no mass.      Tenderness: There is no abdominal tenderness. There is no right CVA tenderness or left CVA tenderness.      Hernia: No " hernia is present.   Genitourinary:     General: Normal vulva.      Exam position: Supine.      Vagina: No vaginal discharge.      Cervix: Normal.      Uterus: Normal.       Adnexa: Right adnexa normal and left adnexa normal.      Rectum: Normal.   Skin:     Coloration: Skin is pale.   Neurological:      Mental Status: She is alert.         Assessment and Plan:  Problem List Items Addressed This Visit        Unprioritized    Dyslipidemia with elevated low density lipoprotein (LDL) cholesterol and abnormally low high density lipoprotein cholesterol    Overview     Stable diet and exercise discussed.          Hypertension, benign    Overview     Controlled. No evidence hypotension, pulse 90         Overweight with body mass index (BMI) of 28 to 28.9 in adult    Overview     Diet exercise and wt loss encouraged.            Other Visit Diagnoses     Menstrual irregularity    -  Primary    Lab, arrange for u/s and EMB    Relevant Medications    norgestimate-ethinyl estradiol (Ortho-Cyclen, 28,) 0.25-35 MG-MCG per tablet    Other Relevant Orders    POCT urinalysis dipstick, manual (Completed)    POCT pregnancy, urine (Completed)    US Pelvis Transvaginal Non OB    CBC & Differential (Completed)    Comprehensive Metabolic Panel (Completed)    TSH (Completed)    Follicle Stimulating Hormone (Completed)    Luteinizing Hormone (Completed)          An After Visit Summary and PPPS were given to the patient.       I wore protective equipment throughout this patient encounter to include mask and eye protection. Hand hygiene was performed before donning protective equipment and after removal when leaving the room.

## 2020-09-23 ENCOUNTER — OFFICE VISIT (OUTPATIENT)
Dept: FAMILY MEDICINE CLINIC | Facility: CLINIC | Age: 50
End: 2020-09-23

## 2020-09-23 VITALS
SYSTOLIC BLOOD PRESSURE: 148 MMHG | HEART RATE: 91 BPM | WEIGHT: 166 LBS | RESPIRATION RATE: 16 BRPM | OXYGEN SATURATION: 98 % | TEMPERATURE: 97.5 F | BODY MASS INDEX: 28.34 KG/M2 | DIASTOLIC BLOOD PRESSURE: 60 MMHG | HEIGHT: 64 IN

## 2020-09-23 DIAGNOSIS — D50.0 IRON DEFICIENCY ANEMIA DUE TO CHRONIC BLOOD LOSS: ICD-10-CM

## 2020-09-23 DIAGNOSIS — D25.1 INTRAMURAL LEIOMYOMA OF UTERUS: ICD-10-CM

## 2020-09-23 DIAGNOSIS — I10 HYPERTENSION, BENIGN: ICD-10-CM

## 2020-09-23 DIAGNOSIS — E66.3 OVERWEIGHT WITH BODY MASS INDEX (BMI) OF 28 TO 28.9 IN ADULT: ICD-10-CM

## 2020-09-23 DIAGNOSIS — N92.6 MENSTRUAL IRREGULARITY: Primary | ICD-10-CM

## 2020-09-23 LAB
ALBUMIN SERPL-MCNC: 4.3 G/DL (ref 3.8–4.8)
ALBUMIN/GLOB SERPL: 1.3 {RATIO} (ref 1.2–2.2)
ALP SERPL-CCNC: 62 IU/L (ref 39–117)
ALT SERPL-CCNC: 10 IU/L (ref 0–32)
AST SERPL-CCNC: 12 IU/L (ref 0–40)
BASOPHILS # BLD AUTO: 0.1 X10E3/UL (ref 0–0.2)
BASOPHILS NFR BLD AUTO: 1 %
BILIRUB SERPL-MCNC: <0.2 MG/DL (ref 0–1.2)
BUN SERPL-MCNC: 8 MG/DL (ref 6–24)
BUN/CREAT SERPL: 8 (ref 9–23)
CALCIUM SERPL-MCNC: 9.1 MG/DL (ref 8.7–10.2)
CHLORIDE SERPL-SCNC: 103 MMOL/L (ref 96–106)
CO2 SERPL-SCNC: 22 MMOL/L (ref 20–29)
CREAT SERPL-MCNC: 1 MG/DL (ref 0.57–1)
EOSINOPHIL # BLD AUTO: 0.3 X10E3/UL (ref 0–0.4)
EOSINOPHIL NFR BLD AUTO: 3 %
ERYTHROCYTE [DISTWIDTH] IN BLOOD BY AUTOMATED COUNT: 17.7 % (ref 11.7–15.4)
FSH SERPL-ACNC: 5.5 MIU/ML
GLOBULIN SER CALC-MCNC: 3.4 G/DL (ref 1.5–4.5)
GLUCOSE SERPL-MCNC: 95 MG/DL (ref 65–99)
HCT VFR BLD AUTO: 28.2 % (ref 34–46.6)
HGB BLD-MCNC: 7.9 G/DL (ref 11.1–15.9)
IMM GRANULOCYTES # BLD AUTO: 0.1 X10E3/UL (ref 0–0.1)
IMM GRANULOCYTES NFR BLD AUTO: 1 %
LH SERPL-ACNC: 2 MIU/ML
LYMPHOCYTES # BLD AUTO: 5.2 X10E3/UL (ref 0.7–3.1)
LYMPHOCYTES NFR BLD AUTO: 42 %
MCH RBC QN AUTO: 19.9 PG (ref 26.6–33)
MCHC RBC AUTO-ENTMCNC: 28 G/DL (ref 31.5–35.7)
MCV RBC AUTO: 71 FL (ref 79–97)
MONOCYTES # BLD AUTO: 1.1 X10E3/UL (ref 0.1–0.9)
MONOCYTES NFR BLD AUTO: 9 %
NEUTROPHILS # BLD AUTO: 5.6 X10E3/UL (ref 1.4–7)
NEUTROPHILS NFR BLD AUTO: 44 %
PLATELET # BLD AUTO: 543 X10E3/UL (ref 150–450)
POTASSIUM SERPL-SCNC: 4.1 MMOL/L (ref 3.5–5.2)
PROT SERPL-MCNC: 7.7 G/DL (ref 6–8.5)
RBC # BLD AUTO: 3.97 X10E6/UL (ref 3.77–5.28)
SODIUM SERPL-SCNC: 139 MMOL/L (ref 134–144)
TSH SERPL DL<=0.005 MIU/L-ACNC: 2.66 UIU/ML (ref 0.45–4.5)
WBC # BLD AUTO: 12.5 X10E3/UL (ref 3.4–10.8)

## 2020-09-23 PROCEDURE — 58100 BIOPSY OF UTERUS LINING: CPT | Performed by: FAMILY MEDICINE

## 2020-09-23 RX ORDER — FERROUS SULFATE 325(65) MG
325 TABLET ORAL
Qty: 90 TABLET | Refills: 12 | Status: SHIPPED | OUTPATIENT
Start: 2020-09-23 | End: 2021-03-03

## 2020-09-23 NOTE — PROGRESS NOTES
Subjective   Nancy Marcano is a 49 y.o. female.     Chief Complaint   Patient presents with   • Menstrual irregularity       Nancy Marcano declines flu vaccine. The protection afforded vs the risk of life threatening secondary infection was explained. She is encouraged to reconsider.    Menstrual Irregularity:  Nancy Marcano is here today with complaints of heavy menses. Nancy was last seen in office on 2020. A pelvic ultrasound was performed at [facility] today. Nancy is here today for an  endometrial biopsy. She is -1, Parity-1. LMP-2020. Nancy has been bleeding for the past six weeks without cease. She is premenopausal. Onset was 6 months ago. Nancy was advised to discontinue her birth control a year ago by Dr. Davis- gynecologist and Myself. She continued to have menstrual periods after stopping birth control. Dr. Galeano advised to continue taking birth control on . Associated symptoms include abdominal cramps, dizziness, nausea and low back pain Pertinent surgical history includes none. U/s shows multiple fibroids within the uterus. Endometrial strip 2.4 mm.      Hypertension  This is a chronic problem. The current episode started more than 1 year ago. The problem is controlled. Pertinent negatives include no chest pain, orthopnea, palpitations, peripheral edema, PND, shortness of breath or sweats. Risk factors for coronary artery disease include dyslipidemia and family history. Current antihypertension treatment includes beta blockers. The current treatment provides significant improvement.        The following portions of the patient's history were reviewed and updated as appropriate: allergies, current medications, past family history, past medical history, past social history, past surgical history and problem list.    Allergies:  No Known Allergies    Social History:  Social History     Socioeconomic History   • Marital status:       Spouse name: Not on file   • Number of children: Not on file   • Years of education: Not on file   • Highest education level: Not on file   Tobacco Use   • Smoking status: Never Smoker   • Smokeless tobacco: Never Used   Substance and Sexual Activity   • Alcohol use: Not Currently   • Drug use: Not Currently   • Sexual activity: Defer       Family History:  Family History   Problem Relation Age of Onset   • Heart disease Mother         Cardiovascular    • Hyperlipidemia Mother    • Crohn's disease Mother    • Heart disease Father         Cardiovascular    • Hyperlipidemia Father    • Diabetes Paternal Grandmother    • Diabetes Paternal Aunt        Past Medical History :  Patient Active Problem List   Diagnosis   • Chronic seasonal allergic rhinitis due to pollen   • Dense breast   • Dyslipidemia with elevated low density lipoprotein (LDL) cholesterol and abnormally low high density lipoprotein cholesterol   • Gastroesophageal reflux disease without esophagitis   • Hypertension, benign   • Menopausal syndrome   • Mild intermittent asthma without complication   • Overweight with body mass index (BMI) of 28 to 28.9 in adult   • Annual visit for general adult medical examination with abnormal findings   • Encounter for screening for malignant neoplasm of breast   • Cervical cancer screening   • Asymptomatic menopausal state   • Colon cancer screening   • Irregular menses       Medication List:  Outpatient Encounter Medications as of 9/23/2020   Medication Sig Dispense Refill   • albuterol sulfate  (90 Base) MCG/ACT inhaler Inhale 2 puffs As Needed.     • aspirin 81 MG EC tablet Take 81 mg by mouth Daily.     • atenolol (TENORMIN) 25 MG tablet Take 1 tablet by mouth 2 (Two) Times a Day. 180 tablet 1   • loratadine (CLARITIN REDITABS) 10 MG disintegrating tablet Take 10 mg by mouth Daily.     • montelukast (SINGULAIR) 10 MG tablet Take 1 tablet by mouth Daily. 90 tablet 3   • Multiple Vitamins-Iron (ONE DAILY  "MULTIVITAMIN/IRON PO) Take 1 tablet by mouth Daily.     • norgestimate-ethinyl estradiol (Ortho-Cyclen, 28,) 0.25-35 MG-MCG per tablet Take 1 tablet by mouth Daily. 2 tab daily until no bleeding then 1 daily thereafter 1 package 12   • Omega-3 Fatty Acids (FISH OIL) 1000 MG capsule capsule Take 1 capsule by mouth 2 (Two) Times a Day.     • omeprazole (priLOSEC) 20 MG capsule Take 1 capsule by mouth Daily. 90 capsule 3   • ferrous sulfate 325 (65 FE) MG tablet Take 1 tablet by mouth 3 (Three) Times a Day Before Meals. 90 tablet 12     No facility-administered encounter medications on file as of 9/23/2020.        Past Surgical History:  Past Surgical History:   Procedure Laterality Date   • [facility]ARACT EXTRACTION [facility] 08/28/2017    [facility]   • CATARACT EXTRACTION Right 2018    [facility]   • KIDNEY SURGERY      Lithotripsy       Review of Systems:  Review of Systems   Constitutional: Positive for fatigue. Negative for fever, unexpected weight gain and unexpected weight loss.   Respiratory: Negative for shortness of breath.    Cardiovascular: Negative for chest pain, palpitations, orthopnea and PND.   Gastrointestinal: Negative for abdominal pain, constipation, diarrhea, nausea and vomiting.   Endocrine: Negative for cold intolerance, heat intolerance, polydipsia and polyuria.   Genitourinary: Positive for menstrual problem.   Neurological: Positive for dizziness.       I have reviewed and confirmed the accuracy of the HPI and ROS as documented by the MA/LPN/RN Daly Galeano MD    Vital Signs:  Visit Vitals  /60 (BP Location: Right arm, Patient Position: Sitting, Cuff Size: Adult)   Pulse 91   Temp 97.5 °F (36.4 °C) (Oral)   Resp 16   Ht 162.6 cm (64\")   Wt 75.3 kg (166 lb)   LMP 08/09/2020   SpO2 98% Comment: Room air   BMI 28.49 kg/m²       Physical Exam  Constitutional:       General: She is not in acute distress.     Appearance: Normal appearance.   Neck:      Musculoskeletal: Neck supple.    "   Thyroid: No thyromegaly.   Cardiovascular:      Rate and Rhythm: Normal rate.      Pulses: Normal pulses.   Pulmonary:      Breath sounds: Normal breath sounds. No wheezing.   Abdominal:      General: Abdomen is flat.      Palpations: There is no mass.      Tenderness: There is no abdominal tenderness.   Genitourinary:     General: Normal vulva.      Vagina: Normal.      Cervix: Normal.      Uterus: Enlarged.       Adnexa: Right adnexa normal and left adnexa normal.   Lymphadenopathy:      Cervical: No cervical adenopathy.   Neurological:      Mental Status: She is alert.     Procedures   Endometrial Bx  The cx was identified prepped. The Pipelle was inserted and the uterus sounded to 10 cm, Moderate endometrial tissue was obtained and the specimen was sent for pathology. She tolerated the procedure well and left the office in stable condition.       Assessment and Plan:  Problem List Items Addressed This Visit        Unprioritized    Hypertension, benign    Overview     Controlled. No evidence hypotension, pulse 90         Overweight with body mass index (BMI) of 28 to 28.9 in adult    Overview     Diet exercise and wt loss encouraged.            Other Visit Diagnoses     Menstrual irregularity    -  Primary    GYN referral arranged ASAP. Continue ON 1/35 2 daily and follow.     Relevant Orders    Reference Histopathology    Ambulatory Referral to Obstetrics / Gynecology    Intramural leiomyoma of uterus        Iron deficiency anemia due to chronic blood loss        Hgb 7.9 begin FESO4 325 mg tid. and follow closely.     Relevant Medications    ferrous sulfate 325 (65 FE) MG tablet          An After Visit Summary and PPP'S were given to the patient.       I wore protective equipment throughout this patient encounter to include mask and eye protection. Hand hygiene was performed before donning protective equipment and after removal when leaving the room.

## 2020-09-26 LAB
DX ICD CODE: NORMAL
PATH REPORT.FINAL DX SPEC: NORMAL
PATH REPORT.GROSS SPEC: NORMAL
PATH REPORT.SITE OF ORIGIN SPEC: NORMAL
PATHOLOGIST NAME: NORMAL
PAYMENT PROCEDURE: NORMAL

## 2020-12-12 ENCOUNTER — TRANSCRIBE ORDERS (OUTPATIENT)
Dept: ADMINISTRATIVE | Facility: HOSPITAL | Age: 50
End: 2020-12-12

## 2020-12-12 ENCOUNTER — LAB (OUTPATIENT)
Dept: LAB | Facility: HOSPITAL | Age: 50
End: 2020-12-12

## 2020-12-12 ENCOUNTER — HOSPITAL ENCOUNTER (OUTPATIENT)
Dept: CARDIOLOGY | Facility: HOSPITAL | Age: 50
Discharge: HOME | End: 2020-12-12

## 2020-12-12 DIAGNOSIS — Z01.818 OTHER SPECIFIED PRE-OPERATIVE EXAMINATION: Primary | ICD-10-CM

## 2020-12-12 DIAGNOSIS — Z01.818 OTHER SPECIFIED PRE-OPERATIVE EXAMINATION: ICD-10-CM

## 2020-12-12 LAB
ABO GROUP BLD: NORMAL
BASOPHILS # BLD AUTO: 0.18 10*3/MM3 (ref 0–0.2)
BASOPHILS NFR BLD AUTO: 1.6 % (ref 0–1.5)
BLD GP AB SCN SERPL QL: NEGATIVE
DEPRECATED RDW RBC AUTO: 55.6 FL (ref 37–54)
EOSINOPHIL # BLD AUTO: 0.41 10*3/MM3 (ref 0–0.4)
EOSINOPHIL NFR BLD AUTO: 3.5 % (ref 0.3–6.2)
ERYTHROCYTE [DISTWIDTH] IN BLOOD BY AUTOMATED COUNT: 17.2 % (ref 12.3–15.4)
HCT VFR BLD AUTO: 36 % (ref 34–46.6)
HGB BLD-MCNC: 11.3 G/DL (ref 12–15.9)
IMM GRANULOCYTES # BLD AUTO: 0.07 10*3/MM3 (ref 0–0.05)
IMM GRANULOCYTES NFR BLD AUTO: 0.6 % (ref 0–0.5)
LYMPHOCYTES # BLD AUTO: 3.22 10*3/MM3 (ref 0.7–3.1)
LYMPHOCYTES NFR BLD AUTO: 27.8 % (ref 19.6–45.3)
MCH RBC QN AUTO: 28.2 PG (ref 26.6–33)
MCHC RBC AUTO-ENTMCNC: 31.4 G/DL (ref 31.5–35.7)
MCV RBC AUTO: 89.8 FL (ref 79–97)
MONOCYTES # BLD AUTO: 1.01 10*3/MM3 (ref 0.1–0.9)
MONOCYTES NFR BLD AUTO: 8.7 % (ref 5–12)
NEUTROPHILS NFR BLD AUTO: 57.8 % (ref 42.7–76)
NEUTROPHILS NFR BLD AUTO: 6.71 10*3/MM3 (ref 1.7–7)
NRBC BLD AUTO-RTO: 0 /100 WBC (ref 0–0.2)
PLATELET # BLD AUTO: 416 10*3/MM3 (ref 140–450)
PMV BLD AUTO: 9.5 FL (ref 6–12)
QT INTERVAL: 371 MS
RBC # BLD AUTO: 4.01 10*6/MM3 (ref 3.77–5.28)
RH BLD: NEGATIVE
T&S EXPIRATION DATE: NORMAL
WBC # BLD AUTO: 11.6 10*3/MM3 (ref 3.4–10.8)

## 2020-12-12 PROCEDURE — 86850 RBC ANTIBODY SCREEN: CPT

## 2020-12-12 PROCEDURE — 93010 ELECTROCARDIOGRAM REPORT: CPT | Performed by: INTERNAL MEDICINE

## 2020-12-12 PROCEDURE — 86901 BLOOD TYPING SEROLOGIC RH(D): CPT

## 2020-12-12 PROCEDURE — 86900 BLOOD TYPING SEROLOGIC ABO: CPT

## 2020-12-12 PROCEDURE — 36415 COLL VENOUS BLD VENIPUNCTURE: CPT

## 2020-12-12 PROCEDURE — 85025 COMPLETE CBC W/AUTO DIFF WBC: CPT

## 2020-12-12 PROCEDURE — 93005 ELECTROCARDIOGRAM TRACING: CPT | Performed by: ANESTHESIOLOGY

## 2020-12-22 PROCEDURE — 88307 TISSUE EXAM BY PATHOLOGIST: CPT | Performed by: OBSTETRICS & GYNECOLOGY

## 2020-12-23 ENCOUNTER — LAB REQUISITION (OUTPATIENT)
Dept: LAB | Facility: HOSPITAL | Age: 50
End: 2020-12-23

## 2020-12-23 DIAGNOSIS — N92.0 EXCESSIVE AND FREQUENT MENSTRUATION WITH REGULAR CYCLE: ICD-10-CM

## 2020-12-23 LAB
BASOPHILS # BLD AUTO: 0.1 10*3/MM3 (ref 0–0.2)
BASOPHILS NFR BLD AUTO: 0.3 % (ref 0–1.5)
DEPRECATED RDW RBC AUTO: 59.1 FL (ref 37–54)
EOSINOPHIL # BLD AUTO: 0 10*3/MM3 (ref 0–0.4)
EOSINOPHIL NFR BLD AUTO: 0.1 % (ref 0.3–6.2)
ERYTHROCYTE [DISTWIDTH] IN BLOOD BY AUTOMATED COUNT: 19.6 % (ref 12.3–15.4)
HCT VFR BLD AUTO: 30.7 % (ref 34–46.6)
HGB BLD-MCNC: 9.8 G/DL (ref 12–15.9)
LYMPHOCYTES # BLD AUTO: 2.6 10*3/MM3 (ref 0.7–3.1)
LYMPHOCYTES NFR BLD AUTO: 12.9 % (ref 19.6–45.3)
MCH RBC QN AUTO: 27.3 PG (ref 26.6–33)
MCHC RBC AUTO-ENTMCNC: 31.9 G/DL (ref 31.5–35.7)
MCV RBC AUTO: 85.6 FL (ref 79–97)
MONOCYTES # BLD AUTO: 1.1 10*3/MM3 (ref 0.1–0.9)
MONOCYTES NFR BLD AUTO: 5.8 % (ref 5–12)
NEUTROPHILS NFR BLD AUTO: 16 10*3/MM3 (ref 1.7–7)
NEUTROPHILS NFR BLD AUTO: 80.9 % (ref 42.7–76)
NRBC BLD AUTO-RTO: 0 /100 WBC (ref 0–0.2)
PLATELET # BLD AUTO: 416 10*3/MM3 (ref 140–450)
PMV BLD AUTO: 7.6 FL (ref 6–12)
RBC # BLD AUTO: 3.59 10*6/MM3 (ref 3.77–5.28)
WBC # BLD AUTO: 19.7 10*3/MM3 (ref 3.4–10.8)

## 2020-12-23 PROCEDURE — 85025 COMPLETE CBC W/AUTO DIFF WBC: CPT | Performed by: OBSTETRICS & GYNECOLOGY

## 2020-12-24 LAB
LAB AP CASE REPORT: NORMAL
PATH REPORT.FINAL DX SPEC: NORMAL
PATH REPORT.GROSS SPEC: NORMAL

## 2021-01-26 ENCOUNTER — TELEPHONE (OUTPATIENT)
Dept: FAMILY MEDICINE CLINIC | Facility: CLINIC | Age: 51
End: 2021-01-26

## 2021-01-27 DIAGNOSIS — B00.9 HERPES SIMPLEX: ICD-10-CM

## 2021-01-27 DIAGNOSIS — J30.1 CHRONIC SEASONAL ALLERGIC RHINITIS DUE TO POLLEN: Primary | ICD-10-CM

## 2021-02-02 RX ORDER — ACYCLOVIR 800 MG/1
800 TABLET ORAL
Qty: 50 TABLET | Refills: 0 | Status: SHIPPED | OUTPATIENT
Start: 2021-02-02

## 2021-02-02 RX ORDER — ALBUTEROL SULFATE 90 UG/1
2 AEROSOL, METERED RESPIRATORY (INHALATION) EVERY 4 HOURS PRN
Qty: 18 G | Refills: 4 | Status: SHIPPED | OUTPATIENT
Start: 2021-02-02

## 2021-02-11 DIAGNOSIS — I10 HYPERTENSION, BENIGN: ICD-10-CM

## 2021-02-11 RX ORDER — ATENOLOL 25 MG/1
TABLET ORAL
Qty: 180 TABLET | Refills: 1 | Status: SHIPPED | OUTPATIENT
Start: 2021-02-11 | End: 2021-08-09

## 2021-02-14 DIAGNOSIS — K21.9 GASTROESOPHAGEAL REFLUX DISEASE WITHOUT ESOPHAGITIS: ICD-10-CM

## 2021-02-14 DIAGNOSIS — J30.1 CHRONIC SEASONAL ALLERGIC RHINITIS DUE TO POLLEN: ICD-10-CM

## 2021-02-15 RX ORDER — MONTELUKAST SODIUM 10 MG/1
TABLET ORAL
Qty: 90 TABLET | Refills: 3 | Status: SHIPPED | OUTPATIENT
Start: 2021-02-15 | End: 2022-02-08

## 2021-02-15 RX ORDER — OMEPRAZOLE 20 MG/1
CAPSULE, DELAYED RELEASE ORAL
Qty: 90 CAPSULE | Refills: 3 | Status: SHIPPED | OUTPATIENT
Start: 2021-02-15 | End: 2021-03-03 | Stop reason: SDUPTHER

## 2021-02-26 DIAGNOSIS — Z11.59 NEED FOR HEPATITIS C SCREENING TEST: ICD-10-CM

## 2021-02-26 DIAGNOSIS — E78.5 DYSLIPIDEMIA WITH ELEVATED LOW DENSITY LIPOPROTEIN (LDL) CHOLESTEROL AND ABNORMALLY LOW HIGH DENSITY LIPOPROTEIN CHOLESTEROL: ICD-10-CM

## 2021-02-26 DIAGNOSIS — I10 HYPERTENSION, BENIGN: Primary | ICD-10-CM

## 2021-03-01 PROBLEM — Z12.31 ENCOUNTER FOR SCREENING MAMMOGRAM FOR MALIGNANT NEOPLASM OF BREAST: Status: ACTIVE | Noted: 2020-01-10

## 2021-03-01 LAB
ALBUMIN SERPL-MCNC: 4.3 G/DL (ref 3.8–4.9)
ALBUMIN/GLOB SERPL: 1.4 {RATIO} (ref 1.2–2.2)
ALP SERPL-CCNC: 82 IU/L (ref 39–117)
ALT SERPL-CCNC: 29 IU/L (ref 0–32)
AST SERPL-CCNC: 27 IU/L (ref 0–40)
BASOPHILS # BLD AUTO: 0.1 X10E3/UL (ref 0–0.2)
BASOPHILS NFR BLD AUTO: 1 %
BILIRUB SERPL-MCNC: 0.3 MG/DL (ref 0–1.2)
BUN SERPL-MCNC: 9 MG/DL (ref 6–24)
BUN/CREAT SERPL: 9 (ref 9–23)
CALCIUM SERPL-MCNC: 10.4 MG/DL (ref 8.7–10.2)
CHLORIDE SERPL-SCNC: 103 MMOL/L (ref 96–106)
CHOLEST SERPL-MCNC: 180 MG/DL (ref 100–199)
CHOLEST/HDLC SERPL: 3.9 RATIO (ref 0–4.4)
CO2 SERPL-SCNC: 25 MMOL/L (ref 20–29)
CREAT SERPL-MCNC: 1.02 MG/DL (ref 0.57–1)
EOSINOPHIL # BLD AUTO: 0.3 X10E3/UL (ref 0–0.4)
EOSINOPHIL NFR BLD AUTO: 3 %
ERYTHROCYTE [DISTWIDTH] IN BLOOD BY AUTOMATED COUNT: 19.3 % (ref 11.7–15.4)
GLOBULIN SER CALC-MCNC: 3.1 G/DL (ref 1.5–4.5)
GLUCOSE SERPL-MCNC: 100 MG/DL (ref 65–99)
HCT VFR BLD AUTO: 38.8 % (ref 34–46.6)
HCV AB S/CO SERPL IA: <0.1 S/CO RATIO (ref 0–0.9)
HDLC SERPL-MCNC: 46 MG/DL
HGB BLD-MCNC: 12.6 G/DL (ref 11.1–15.9)
IMM GRANULOCYTES # BLD AUTO: 0.1 X10E3/UL (ref 0–0.1)
IMM GRANULOCYTES NFR BLD AUTO: 1 %
LDLC SERPL CALC-MCNC: 110 MG/DL (ref 0–99)
LYMPHOCYTES # BLD AUTO: 3.5 X10E3/UL (ref 0.7–3.1)
LYMPHOCYTES NFR BLD AUTO: 37 %
MCH RBC QN AUTO: 28.5 PG (ref 26.6–33)
MCHC RBC AUTO-ENTMCNC: 32.5 G/DL (ref 31.5–35.7)
MCV RBC AUTO: 88 FL (ref 79–97)
MONOCYTES # BLD AUTO: 0.7 X10E3/UL (ref 0.1–0.9)
MONOCYTES NFR BLD AUTO: 7 %
NEUTROPHILS # BLD AUTO: 4.7 X10E3/UL (ref 1.4–7)
NEUTROPHILS NFR BLD AUTO: 51 %
PLATELET # BLD AUTO: 360 X10E3/UL (ref 150–450)
POTASSIUM SERPL-SCNC: 4.5 MMOL/L (ref 3.5–5.2)
PROT SERPL-MCNC: 7.4 G/DL (ref 6–8.5)
RBC # BLD AUTO: 4.42 X10E6/UL (ref 3.77–5.28)
SODIUM SERPL-SCNC: 141 MMOL/L (ref 134–144)
TRIGL SERPL-MCNC: 135 MG/DL (ref 0–149)
TSH SERPL DL<=0.005 MIU/L-ACNC: 1.45 UIU/ML (ref 0.45–4.5)
VLDLC SERPL CALC-MCNC: 24 MG/DL (ref 5–40)
WBC # BLD AUTO: 9.3 X10E3/UL (ref 3.4–10.8)

## 2021-03-03 ENCOUNTER — OFFICE VISIT (OUTPATIENT)
Dept: FAMILY MEDICINE CLINIC | Facility: CLINIC | Age: 51
End: 2021-03-03

## 2021-03-03 VITALS
BODY MASS INDEX: 30.05 KG/M2 | HEART RATE: 70 BPM | OXYGEN SATURATION: 96 % | WEIGHT: 176 LBS | DIASTOLIC BLOOD PRESSURE: 88 MMHG | HEIGHT: 64 IN | RESPIRATION RATE: 16 BRPM | SYSTOLIC BLOOD PRESSURE: 132 MMHG | TEMPERATURE: 97.8 F

## 2021-03-03 DIAGNOSIS — R92.2 DENSE BREAST: ICD-10-CM

## 2021-03-03 DIAGNOSIS — E66.3 OVERWEIGHT WITH BODY MASS INDEX (BMI) OF 28 TO 28.9 IN ADULT: ICD-10-CM

## 2021-03-03 DIAGNOSIS — Z12.11 COLON CANCER SCREENING: ICD-10-CM

## 2021-03-03 DIAGNOSIS — I10 HYPERTENSION, BENIGN: ICD-10-CM

## 2021-03-03 DIAGNOSIS — Z12.4 CERVICAL CANCER SCREENING: ICD-10-CM

## 2021-03-03 DIAGNOSIS — K21.9 GASTROESOPHAGEAL REFLUX DISEASE WITHOUT ESOPHAGITIS: ICD-10-CM

## 2021-03-03 DIAGNOSIS — Z78.0 ASYMPTOMATIC MENOPAUSAL STATE: ICD-10-CM

## 2021-03-03 DIAGNOSIS — Z00.01 ANNUAL VISIT FOR GENERAL ADULT MEDICAL EXAMINATION WITH ABNORMAL FINDINGS: Primary | ICD-10-CM

## 2021-03-03 DIAGNOSIS — E78.5 DYSLIPIDEMIA WITH ELEVATED LOW DENSITY LIPOPROTEIN (LDL) CHOLESTEROL AND ABNORMALLY LOW HIGH DENSITY LIPOPROTEIN CHOLESTEROL: ICD-10-CM

## 2021-03-03 DIAGNOSIS — Z12.31 ENCOUNTER FOR SCREENING MAMMOGRAM FOR MALIGNANT NEOPLASM OF BREAST: ICD-10-CM

## 2021-03-03 LAB
BILIRUB BLD-MCNC: NEGATIVE MG/DL
CLARITY, POC: CLEAR
COLOR UR: YELLOW
GLUCOSE UR STRIP-MCNC: NEGATIVE MG/DL
KETONES UR QL: NEGATIVE
LEUKOCYTE EST, POC: NEGATIVE
NITRITE UR-MCNC: NEGATIVE MG/ML
PH UR: 5 [PH] (ref 5–8)
PROT UR STRIP-MCNC: NEGATIVE MG/DL
RBC # UR STRIP: NEGATIVE /UL
SP GR UR: 1 (ref 1–1.03)
UROBILINOGEN UR QL: NORMAL

## 2021-03-03 PROCEDURE — 99396 PREV VISIT EST AGE 40-64: CPT | Performed by: FAMILY MEDICINE

## 2021-03-03 PROCEDURE — 99213 OFFICE O/P EST LOW 20 MIN: CPT | Performed by: FAMILY MEDICINE

## 2021-03-03 PROCEDURE — 81002 URINALYSIS NONAUTO W/O SCOPE: CPT | Performed by: FAMILY MEDICINE

## 2021-03-03 RX ORDER — OMEPRAZOLE 20 MG/1
20 CAPSULE, DELAYED RELEASE ORAL DAILY
Qty: 90 CAPSULE | Refills: 3 | Status: SHIPPED | OUTPATIENT
Start: 2021-03-03 | End: 2022-05-13 | Stop reason: SDUPTHER

## 2021-03-03 NOTE — PROGRESS NOTES
Chief Complaint  Annual Exam, Hypertension, and Hyperlipidemia    Subjective     CC  Problem List  Visit Diagnosis   Encounters  Notes  Medications  Labs  Result Review Imaging  Media    Nancy Marcano presents to [facility] for an annual exam. The patient is here: for coordination of medical care to discuss health maintenance and disease prevention to follow up on multiple medical problems. Overall has: moderate activity with work/home activities, exercises 2 - 3 times per week, good appetite, feels well with no complaints, good energy level and is sleeping well. Nutrition: balanced diet. Last tetanus shot was 2016.     Nancy had a hysterectomy with Dr. Joaquin on 12/22/2020 for heavy bleeding.     Hypertension  This is a chronic problem. The current episode started more than 1 year ago. The problem is controlled. Pertinent negatives include no chest pain, headaches, orthopnea, palpitations, peripheral edema, PND, shortness of breath or sweats. Risk factors for coronary artery disease include dyslipidemia, post-menopausal state and family history. Current antihypertension treatment includes beta blockers. The current treatment provides significant improvement.   Hyperlipidemia  This is a chronic problem. The current episode started more than 1 year ago. The problem is controlled. Recent lipid tests were reviewed and are normal. She has no history of diabetes or obesity. Pertinent negatives include no chest pain, myalgias or shortness of breath. Current antihyperlipidemic treatment includes herbal therapy. The current treatment provides significant improvement of lipids. Risk factors for coronary artery disease include dyslipidemia, hypertension, post-menopausal and family history.       Review of Systems   Constitutional: Negative for activity change, appetite change, fatigue, fever, unexpected weight gain and unexpected weight loss.   HENT: Negative for  "congestion, ear pain, hearing loss, rhinorrhea, sinus pressure, sore throat, swollen glands, trouble swallowing and voice change.    Eyes: Negative for visual disturbance.   Respiratory: Negative for apnea, cough, shortness of breath and wheezing.    Cardiovascular: Negative for chest pain, palpitations, orthopnea and PND.   Gastrointestinal: Negative for abdominal pain, blood in stool, constipation, diarrhea, nausea, vomiting and indigestion.   Endocrine: Negative for cold intolerance, heat intolerance, polydipsia and polyuria.   Genitourinary: Negative for breast discharge, breast lump, breast pain, dysuria, flank pain, frequency, pelvic pain and vaginal bleeding.   Musculoskeletal: Negative for arthralgias, joint swelling and myalgias.   Skin: Negative for rash, skin lesions and bruise.   Allergic/Immunologic: Negative for environmental allergies and immunocompromised state.   Neurological: Negative for dizziness, syncope, weakness, numbness, headache and memory problem.   Hematological: Negative for adenopathy. Does not bruise/bleed easily.   Psychiatric/Behavioral: Negative for suicidal ideas and depressed mood. The patient is not nervous/anxious.         Objective   Vital Signs:   /88 (BP Location: Right arm, Patient Position: Sitting, Cuff Size: Adult)   Pulse 70   Temp 97.8 °F (36.6 °C) (Temporal)   Resp 16   Ht 162.6 cm (64\")   Wt 79.8 kg (176 lb)   SpO2 96% Comment: Room air  BMI 30.21 kg/m²     Physical Exam  Constitutional:       General: She is not in acute distress.     Appearance: She is well-developed.   HENT:      Head: Normocephalic. Hair is normal.      Right Ear: Tympanic membrane and external ear normal.      Left Ear: Tympanic membrane and external ear normal.      Nose: Nose normal. No mucosal edema.      Mouth/Throat:      Pharynx: Uvula midline.   Eyes:      General:         Right eye: No discharge.         Left eye: No discharge.      Conjunctiva/sclera: Conjunctivae normal.    "   Pupils: Pupils are equal, round, and reactive to light.   Neck:      Thyroid: No thyromegaly.      Vascular: No JVD.   Cardiovascular:      Rate and Rhythm: Normal rate and regular rhythm.      Chest Wall: PMI is not displaced.      Pulses:           Carotid pulses are 2+ on the right side and 2+ on the left side.       Femoral pulses are 2+ on the right side and 2+ on the left side.       Dorsalis pedis pulses are 2+ on the right side and 2+ on the left side.      Heart sounds: Normal heart sounds. No murmur. No friction rub. No gallop.       Comments: Negative Homans' no edema  Pulmonary:      Effort: Pulmonary effort is normal. No respiratory distress.      Breath sounds: Normal breath sounds. No decreased breath sounds, wheezing, rhonchi or rales.   Chest:      Breasts: Breasts are symmetrical.         Right: No inverted nipple, mass, nipple discharge, skin change or tenderness.         Left: No inverted nipple, mass, nipple discharge, skin change or tenderness.   Abdominal:      General: Bowel sounds are normal. There is no distension or abdominal bruit.      Palpations: Abdomen is soft. There is no mass.      Tenderness: There is no abdominal tenderness.   Musculoskeletal:         General: No tenderness or deformity. Normal range of motion.      Cervical back: Normal range of motion and neck supple. No muscular tenderness.   Lymphadenopathy:      Cervical: No cervical adenopathy.      Upper Body:      Right upper body: No supraclavicular adenopathy.      Left upper body: No supraclavicular adenopathy.   Skin:     General: Skin is warm and dry.      Findings: No ecchymosis, erythema, lesion or rash.   Neurological:      Mental Status: She is alert and oriented to person, place, and time.      Cranial Nerves: No cranial nerve deficit.      Sensory: No sensory deficit.      Motor: No abnormal muscle tone.      Coordination: Coordination normal.      Deep Tendon Reflexes: Reflexes are normal and symmetric.  Reflexes normal.   Psychiatric:         Speech: Speech normal.         Behavior: Behavior normal.         Thought Content: Thought content normal. Thought content does not include suicidal ideation.         Cognition and Memory: She does not exhibit impaired recent memory or impaired remote memory.         Judgment: Judgment normal. Judgment is not impulsive.        Result Review :Labs  Result Review  Imaging  Med Tab  Media                 Assessment and Plan CC Problem List  Visit Diagnosis  ROS  Review (Popup)  Health Maintenance  Quality  BestPractice  Medications  SmartSets  SnapShot Encounters  Media  Problem List Items Addressed This Visit        Unprioritized    Dense breast    Overview     Nancy Marcano's 5 year risk of having breast cancer is 0.9%. The average woman at age 49 y.o. has a risk of 1.3% of having breast cancer.  Nancy Marcano's life time risk of having breast cancer up to age 90 is 8.8%. The average woman's chance of having breast cancer up to the age of 90 is 11.2%.    We discussed the risk assessment values with Nancy Marcano, she understands that she is at (less) than average risk of developing breast cancer at 5 years and over her life time than the average woman of her age. She does not desire to have genetic testing or further work up at this time.          Dyslipidemia with elevated low density lipoprotein (LDL) cholesterol and abnormally low high density lipoprotein cholesterol    Overview     Stable diet and exercise discussed. Fish oil 1000 mg bid encouraged.          Gastroesophageal reflux disease without esophagitis    Overview     Stable on meds.          Relevant Medications    omeprazole (priLOSEC) 20 MG capsule    Hypertension, benign    Overview     Controlled. No evidence hypotension, pulse 90         Relevant Orders    POCT urinalysis dipstick, manual (Completed)    Overweight with body mass index (BMI) of 28 to 28.9 in adult     Overview     Diet exercise and wt loss encouraged.          Annual visit for general adult medical examination with abnormal findings - Primary    Overview     Diet exercise wt loss, sunscreens seat belts, breast self exams and follow up in 1 yr or prn problems. Previous lab was discussed. We will notify her of today's lab.          Encounter for screening mammogram for malignant neoplasm of breast    Overview     Last mammogram 05/29/2019 Dr. Ryan Lagos Heterogeneously Dense         Relevant Orders    Mammo Screening Digital Tomosynthesis Bilateral With CAD    Cervical cancer screening    Overview     S/p LATOSHA BSO 12/22/2020 heavy bleeding  Last pap smear 05/29/2019 Dr. Davis negative         Asymptomatic menopausal state    Overview     No prior         Relevant Orders    DEXA Bone Density Axial    Colon cancer screening    Overview     She accepted cologuard.                Follow Up Instructions Charge Capture  Follow-up Communications  No follow-ups on file.  Patient was given instructions and counseling regarding her condition or for health maintenance advice. Please see specific information pulled into the AVS if appropriate.

## 2021-03-10 ENCOUNTER — HOSPITAL ENCOUNTER (OUTPATIENT)
Dept: MAMMOGRAPHY | Facility: HOSPITAL | Age: 51
Discharge: HOME | End: 2021-03-10

## 2021-03-10 ENCOUNTER — HOSPITAL ENCOUNTER (OUTPATIENT)
Dept: BONE DENSITY | Facility: HOSPITAL | Age: 51
Discharge: HOME | End: 2021-03-10

## 2021-03-10 DIAGNOSIS — Z78.0 ASYMPTOMATIC MENOPAUSAL STATE: ICD-10-CM

## 2021-03-10 DIAGNOSIS — Z12.31 ENCOUNTER FOR SCREENING MAMMOGRAM FOR MALIGNANT NEOPLASM OF BREAST: ICD-10-CM

## 2021-03-10 PROCEDURE — 77067 SCR MAMMO BI INCL CAD: CPT

## 2021-03-10 PROCEDURE — 77080 DXA BONE DENSITY AXIAL: CPT

## 2021-03-10 PROCEDURE — 77063 BREAST TOMOSYNTHESIS BI: CPT

## 2021-03-10 NOTE — PROGRESS NOTES
*Nancy, your mammogram shows  dense breast, this may make it difficult for radiologist to see as well. The radiology report states no mammographic signs of malignancy. We will repeat your mammogram in one year.

## 2021-04-12 ENCOUNTER — TELEPHONE (OUTPATIENT)
Dept: FAMILY MEDICINE CLINIC | Facility: CLINIC | Age: 51
End: 2021-04-12

## 2021-04-14 ENCOUNTER — OFFICE VISIT (OUTPATIENT)
Dept: FAMILY MEDICINE CLINIC | Facility: CLINIC | Age: 51
End: 2021-04-14

## 2021-04-14 ENCOUNTER — TELEPHONE (OUTPATIENT)
Dept: FAMILY MEDICINE CLINIC | Facility: CLINIC | Age: 51
End: 2021-04-14

## 2021-04-14 VITALS
BODY MASS INDEX: 29.74 KG/M2 | OXYGEN SATURATION: 96 % | WEIGHT: 174.2 LBS | HEIGHT: 64 IN | SYSTOLIC BLOOD PRESSURE: 124 MMHG | TEMPERATURE: 98.4 F | HEART RATE: 92 BPM | RESPIRATION RATE: 18 BRPM | DIASTOLIC BLOOD PRESSURE: 82 MMHG

## 2021-04-14 DIAGNOSIS — M54.42 ACUTE BILATERAL LOW BACK PAIN WITH BILATERAL SCIATICA: ICD-10-CM

## 2021-04-14 DIAGNOSIS — E66.3 OVERWEIGHT WITH BODY MASS INDEX (BMI) OF 28 TO 28.9 IN ADULT: ICD-10-CM

## 2021-04-14 DIAGNOSIS — I10 HYPERTENSION, BENIGN: ICD-10-CM

## 2021-04-14 DIAGNOSIS — M54.41 ACUTE BILATERAL LOW BACK PAIN WITH BILATERAL SCIATICA: ICD-10-CM

## 2021-04-14 DIAGNOSIS — N30.91 CYSTITIS WITH HEMATURIA: ICD-10-CM

## 2021-04-14 DIAGNOSIS — R35.0 URINARY FREQUENCY: Primary | ICD-10-CM

## 2021-04-14 LAB
BILIRUB BLD-MCNC: NEGATIVE MG/DL
CLARITY, POC: CLEAR
COLOR UR: YELLOW
GLUCOSE UR STRIP-MCNC: NEGATIVE MG/DL
KETONES UR QL: NEGATIVE
LEUKOCYTE EST, POC: ABNORMAL
NITRITE UR-MCNC: NEGATIVE MG/ML
PH UR: 5 [PH] (ref 5–8)
PROT UR STRIP-MCNC: NEGATIVE MG/DL
RBC # UR STRIP: ABNORMAL /UL
SP GR UR: 1.02 (ref 1–1.03)
UROBILINOGEN UR QL: NORMAL

## 2021-04-14 PROCEDURE — 99214 OFFICE O/P EST MOD 30 MIN: CPT | Performed by: FAMILY MEDICINE

## 2021-04-14 PROCEDURE — 81002 URINALYSIS NONAUTO W/O SCOPE: CPT | Performed by: FAMILY MEDICINE

## 2021-04-14 RX ORDER — PREDNISONE 10 MG/1
10 TABLET ORAL TAKE AS DIRECTED
Qty: 35 TABLET | Refills: 0 | Status: SHIPPED | OUTPATIENT
Start: 2021-04-14 | End: 2021-04-29

## 2021-04-14 RX ORDER — SULFAMETHOXAZOLE AND TRIMETHOPRIM 800; 160 MG/1; MG/1
1 TABLET ORAL 2 TIMES DAILY
Qty: 20 TABLET | Refills: 0 | Status: SHIPPED | OUTPATIENT
Start: 2021-04-14 | End: 2021-05-12

## 2021-04-14 NOTE — PROGRESS NOTES
Chief Complaint  Urinary Frequency and Hypertension    Subjective     CC  Problem List  Visit Diagnosis   Encounters  Notes  Medications  Labs  Result Review Imaging  Media    Nancy Marcano presents to [facility] for   Urinary Frequency   This is a new problem. The current episode started in the past 7 days. The problem occurs every urination. The problem has been gradually worsening. The quality of the pain is described as aching and burning. The pain is at a severity of 0/10. The patient is experiencing no pain. There has been no fever. Associated symptoms include frequency and urgency. Pertinent negatives include no chills, discharge, flank pain, hematuria, hesitancy, nausea, possible pregnancy, sweats or vomiting. She has tried nothing for the symptoms. The treatment provided no relief.   Hypertension  This is a chronic problem. The current episode started more than 1 year ago. The problem is controlled. Pertinent negatives include no chest pain, headaches, orthopnea, palpitations, peripheral edema, PND, shortness of breath or sweats. Risk factors for coronary artery disease include dyslipidemia, family history, post-menopausal state and obesity. Current antihypertension treatment includes beta blockers. The current treatment provides moderate improvement.   Back Pain  This is a new problem. The current episode started more than 1 month ago. The problem occurs intermittently. The problem has been rapidly worsening since onset. The pain is present in the lumbar spine. The quality of the pain is described as aching. The pain does not radiate. The pain is at a severity of 5/10. The pain is moderate. The symptoms are aggravated by standing. Stiffness is present all day. Associated symptoms include dysuria. Pertinent negatives include no bladder incontinence, bowel incontinence, chest pain, fever, headaches, numbness or paresthesias. She has tried analgesics for  "the symptoms. The treatment provided mild relief.       Review of Systems   Constitutional: Negative for appetite change, chills and fever.   Respiratory: Negative for shortness of breath.    Cardiovascular: Negative for chest pain, palpitations, orthopnea and PND.   Gastrointestinal: Negative for bowel incontinence, nausea and vomiting.   Endocrine: Negative for cold intolerance, heat intolerance, polydipsia and polyuria.   Genitourinary: Positive for dysuria, frequency and urgency. Negative for flank pain, hematuria, hesitancy and urinary incontinence.   Musculoskeletal: Positive for back pain.   Skin: Negative for rash.   Neurological: Negative for numbness and paresthesias.   Hematological: Negative for adenopathy. Does not bruise/bleed easily.        Objective   Vital Signs:   /82 (BP Location: Right arm, Patient Position: Sitting, Cuff Size: Adult)   Pulse 92   Temp 98.4 °F (36.9 °C) (Temporal)   Resp 18   Ht 162.6 cm (64\")   Wt 79 kg (174 lb 3.2 oz)   SpO2 96% Comment: room air  BMI 29.90 kg/m²     Physical Exam  Constitutional:       General: She is not in acute distress.  Cardiovascular:      Rate and Rhythm: Normal rate and regular rhythm.      Heart sounds: Normal heart sounds. No murmur heard.     Pulmonary:      Effort: Pulmonary effort is normal.      Breath sounds: Normal breath sounds.   Abdominal:      General: Bowel sounds are normal.      Palpations: Abdomen is soft.      Tenderness: There is no abdominal tenderness. There is no right CVA tenderness or left CVA tenderness.   Musculoskeletal:      Cervical back: Neck supple.      Lumbar back: Tenderness present. No edema or bony tenderness. Decreased range of motion. Negative right straight leg raise test and negative left straight leg raise test.      Right lower leg: No edema.      Left lower leg: No edema.   Lymphadenopathy:      Cervical: No cervical adenopathy.   Neurological:      Mental Status: She is alert.        Result Review " :Labs  Result Review  Imaging  Med Tab  Media                 Assessment and Plan CC Problem List  Visit Diagnosis  ROS  Review (Popup)  Health Maintenance  Quality  BestPractice  Medications  SmartSets  SnapShot Encounters  Media  Problem List Items Addressed This Visit        Unprioritized    Hypertension, benign    Overview     Controlled.          Current Assessment & Plan     Hypertension is improving with treatment.  Continue current treatment regimen.  Dietary sodium restriction.  Weight loss.  Regular aerobic exercise.  Blood pressure will be reassessed at the next regular appointment.         Overweight with body mass index (BMI) of 28 to 28.9 in adult    Overview     Diet exercise and wt loss encouraged.            Other Visit Diagnoses     Urinary frequency    -  Primary    Relevant Orders    POCT urinalysis dipstick, manual (Completed)    Urine Culture - Urine, Urine, Clean Catch (Completed)    Cystitis with hematuria        Abx push fluids and notify her of cx results.     Relevant Medications    sulfamethoxazole-trimethoprim (BACTRIM DS,SEPTRA DS) 800-160 MG per tablet    Acute bilateral low back pain with bilateral sciatica        systemic streoids, ice heat and ROM, xray and further workup should sxs not improve.    Relevant Medications    predniSONE (DELTASONE) 10 MG tablet          Follow Up Instructions Charge Capture  Follow-up Communications  Return if symptoms worsen or fail to improve.  Patient was given instructions and counseling regarding her condition or for health maintenance advice. Please see specific information pulled into the AVS if appropriate.

## 2021-04-17 LAB
BACTERIA UR CULT: ABNORMAL
BACTERIA UR CULT: ABNORMAL
OTHER ANTIBIOTIC SUSC ISLT: ABNORMAL

## 2021-05-12 ENCOUNTER — OFFICE VISIT (OUTPATIENT)
Dept: FAMILY MEDICINE CLINIC | Facility: CLINIC | Age: 51
End: 2021-05-12

## 2021-05-12 VITALS
SYSTOLIC BLOOD PRESSURE: 128 MMHG | HEART RATE: 89 BPM | BODY MASS INDEX: 30.29 KG/M2 | RESPIRATION RATE: 16 BRPM | WEIGHT: 177.4 LBS | TEMPERATURE: 97.8 F | OXYGEN SATURATION: 98 % | HEIGHT: 64 IN | DIASTOLIC BLOOD PRESSURE: 80 MMHG

## 2021-05-12 DIAGNOSIS — M77.11 LATERAL EPICONDYLITIS OF RIGHT ELBOW: Primary | ICD-10-CM

## 2021-05-12 DIAGNOSIS — E66.09 CLASS 1 OBESITY DUE TO EXCESS CALORIES WITH SERIOUS COMORBIDITY AND BODY MASS INDEX (BMI) OF 30.0 TO 30.9 IN ADULT: ICD-10-CM

## 2021-05-12 DIAGNOSIS — I10 HYPERTENSION, BENIGN: ICD-10-CM

## 2021-05-12 DIAGNOSIS — J30.1 CHRONIC SEASONAL ALLERGIC RHINITIS DUE TO POLLEN: ICD-10-CM

## 2021-05-12 PROCEDURE — 99213 OFFICE O/P EST LOW 20 MIN: CPT | Performed by: FAMILY MEDICINE

## 2021-05-12 NOTE — PROGRESS NOTES
Chief Complaint  Elbow Pain and Hypertension    Subjective     CC  Problem List  Visit Diagnosis   Encounters  Notes  Medications  Labs  Result Review Imaging  Media    Nancy Marcano presents to [facility] for   Arm Pain   The incident occurred more than 1 week ago (3 weeks ago). There was no injury mechanism. The pain is present in the right elbow. The pain is at a severity of 7/10. The pain is moderate. The pain has been constant since the incident. Associated symptoms include muscle weakness. Pertinent negatives include no chest pain or numbness. The symptoms are aggravated by movement and lifting (grasping). She has tried heat, ice and NSAIDs for the symptoms. The treatment provided no relief.   Hypertension  This is a chronic problem. The current episode started more than 1 year ago. The problem is controlled. Pertinent negatives include no chest pain, headaches, orthopnea, palpitations, peripheral edema, PND, shortness of breath or sweats. Risk factors for coronary artery disease include dyslipidemia, post-menopausal state and family history. Current antihypertension treatment includes beta blockers. The current treatment provides significant improvement.       Review of Systems   Constitutional: Negative for fever, unexpected weight gain and unexpected weight loss.   HENT: Positive for congestion.    Respiratory: Positive for cough (dry occasional and chronic). Negative for shortness of breath.    Cardiovascular: Negative for chest pain, palpitations, orthopnea, leg swelling and PND.   Endocrine: Negative for cold intolerance, heat intolerance, polydipsia and polyuria.   Musculoskeletal: Positive for arthralgias (right lateral elbow).   Skin: Negative for rash.   Allergic/Immunologic: Positive for environmental allergies (mild exacerbation. ).   Neurological: Negative for weakness and numbness.   Hematological: Negative for adenopathy. Does not  "bruise/bleed easily.        Objective   Vital Signs:   /80 (BP Location: Left arm, Patient Position: Sitting, Cuff Size: Large Adult)   Pulse 89   Temp 97.8 °F (36.6 °C) (Temporal)   Resp 16   Ht 162.6 cm (64\")   Wt 80.5 kg (177 lb 6.4 oz)   SpO2 98% Comment: Room air  BMI 30.45 kg/m²     Physical Exam  Constitutional:       General: She is not in acute distress.  HENT:      Nose: No congestion.   Cardiovascular:      Rate and Rhythm: Normal rate and regular rhythm.      Pulses: Normal pulses.           Dorsalis pedis pulses are 2+ on the right side and 2+ on the left side.      Heart sounds: No murmur heard.     Pulmonary:      Effort: Pulmonary effort is normal.      Breath sounds: Normal breath sounds. No wheezing.   Musculoskeletal:      Right elbow: No deformity or effusion. Decreased range of motion. Tenderness present in lateral epicondyle.      Cervical back: Neck supple.      Right lower leg: No edema.      Left lower leg: No edema.   Lymphadenopathy:      Cervical: No cervical adenopathy.   Skin:     Findings: No rash.   Neurological:      Mental Status: She is alert.        Result Review :Labs  Result Review  Imaging  Med Tab  Media                 Assessment and Plan CC Problem List  Visit Diagnosis  ROS  Review (Popup)  Health Maintenance  Quality  BestPractice  Medications  SmartSets  SnapShot Encounters  Media  Problem List Items Addressed This Visit        Unprioritized    Chronic seasonal allergic rhinitis due to pollen    Overview     Mild exacerbation with chronic cough add anti histamine Claritin avoidance. Cough med given.          Relevant Medications    HYDROcodone-homatropine (HYCODAN) 5-1.5 MG/5ML syrup    Hypertension, benign    Overview     Controlled compliant         Class 1 obesity due to excess calories with serious comorbidity and body mass index (BMI) of 30.0 to 30.9 in adult    Overview     Diet exercise and wt loss encouraged.   Techniques discussed.    "         Other Visit Diagnoses     Lateral epicondylitis of right elbow    -  Primary    Elasta strap, ice ROM topical & po anti inflammatory OTC and follow up if no improvement.           Follow Up Instructions Charge Capture  Follow-up Communications  Return if symptoms worsen or fail to improve.  Patient was given instructions and counseling regarding her condition or for health maintenance advice. Please see specific information pulled into the AVS if appropriate.

## 2021-07-06 DIAGNOSIS — E83.52 CALCIUM INCREASED SERUM: ICD-10-CM

## 2021-07-06 DIAGNOSIS — R79.89 HIGH SERUM CALCIUM: Primary | ICD-10-CM

## 2021-07-07 ENCOUNTER — OFFICE VISIT (OUTPATIENT)
Dept: FAMILY MEDICINE CLINIC | Facility: CLINIC | Age: 51
End: 2021-07-07

## 2021-07-07 ENCOUNTER — HOSPITAL ENCOUNTER (OUTPATIENT)
Dept: GENERAL RADIOLOGY | Facility: HOSPITAL | Age: 51
Discharge: HOME | End: 2021-07-07
Admitting: FAMILY MEDICINE

## 2021-07-07 VITALS
SYSTOLIC BLOOD PRESSURE: 120 MMHG | HEIGHT: 64 IN | HEART RATE: 91 BPM | WEIGHT: 174.4 LBS | TEMPERATURE: 97.7 F | RESPIRATION RATE: 18 BRPM | DIASTOLIC BLOOD PRESSURE: 80 MMHG | OXYGEN SATURATION: 96 % | BODY MASS INDEX: 29.78 KG/M2

## 2021-07-07 DIAGNOSIS — E66.09 CLASS 1 OBESITY DUE TO EXCESS CALORIES WITH SERIOUS COMORBIDITY AND BODY MASS INDEX (BMI) OF 30.0 TO 30.9 IN ADULT: ICD-10-CM

## 2021-07-07 DIAGNOSIS — M79.601 PAIN OF RIGHT UPPER EXTREMITY: Primary | ICD-10-CM

## 2021-07-07 DIAGNOSIS — M77.11 LATERAL EPICONDYLITIS OF RIGHT ELBOW: ICD-10-CM

## 2021-07-07 DIAGNOSIS — I10 HYPERTENSION, BENIGN: ICD-10-CM

## 2021-07-07 PROCEDURE — 99213 OFFICE O/P EST LOW 20 MIN: CPT | Performed by: FAMILY MEDICINE

## 2021-07-07 PROCEDURE — 73070 X-RAY EXAM OF ELBOW: CPT

## 2021-07-07 RX ORDER — MELOXICAM 15 MG/1
15 TABLET ORAL DAILY
Qty: 90 TABLET | Refills: 3 | Status: SHIPPED | OUTPATIENT
Start: 2021-07-07 | End: 2022-08-16

## 2021-07-07 NOTE — PROGRESS NOTES
Chief Complaint  Arm Pain and Hypertension    Subjective     CC  Problem List  Visit Diagnosis   Encounters  Notes  Medications  Labs  Result Review Imaging  Media    Nancy Marcano presents to [facility] for   Nancy was last seen in office on 05/12/2021 for arm pain. She is here today due to her arm not improving.     Arm Pain   The incident occurred more than 1 week ago (2 months ago). There was no injury mechanism. The pain is present in the right elbow. The quality of the pain is described as aching (sharp). The pain radiates to the right arm. The pain is at a severity of 3/10. The pain is mild. The pain has been constant since the incident. Pertinent negatives include no chest pain, muscle weakness, numbness or tingling. The symptoms are aggravated by movement and lifting (grasping). She has tried NSAIDs for the symptoms. The treatment provided no relief.   Hypertension  This is a chronic problem. The current episode started more than 1 year ago. The problem is controlled. Pertinent negatives include no chest pain, headaches, orthopnea, palpitations, peripheral edema, PND, shortness of breath or sweats. Risk factors for coronary artery disease include dyslipidemia, post-menopausal state and family history. Current antihypertension treatment includes beta blockers. The current treatment provides significant improvement.       Review of Systems   Constitutional: Negative for fever.   Respiratory: Negative for shortness of breath.    Cardiovascular: Negative for chest pain, palpitations, orthopnea and PND.   Endocrine: Negative for cold intolerance, heat intolerance, polydipsia and polyuria.   Musculoskeletal: Positive for arthralgias (right elbow pain, no improvement after steroids, and elastaband).   Neurological: Negative for tingling, weakness and numbness.   Hematological: Negative for adenopathy. Does not bruise/bleed easily.        Objective   Vital  "Signs:   /80 (BP Location: Right arm, Patient Position: Sitting, Cuff Size: Adult)   Pulse 91   Temp 97.7 °F (36.5 °C) (Temporal)   Resp 18   Ht 162.6 cm (64\")   Wt 79.1 kg (174 lb 6.4 oz)   SpO2 96% Comment: room air  BMI 29.94 kg/m²     Physical Exam  Constitutional:       General: She is not in acute distress.  HENT:      Nose: No congestion.   Cardiovascular:      Rate and Rhythm: Normal rate and regular rhythm.      Pulses: Normal pulses.           Dorsalis pedis pulses are 2+ on the right side and 2+ on the left side.      Heart sounds: No murmur heard.     Pulmonary:      Effort: Pulmonary effort is normal.      Breath sounds: Normal breath sounds. No wheezing.   Musculoskeletal:      Right elbow: No deformity or effusion. Normal range of motion. Tenderness present in lateral epicondyle.      Cervical back: Neck supple.   Lymphadenopathy:      Cervical: No cervical adenopathy.   Skin:     Findings: No rash.   Neurological:      Mental Status: She is alert.      Sensory: No sensory deficit.      Motor: No weakness.        Result Review :Labs  Result Review  Imaging  Med Tab  Media                 Assessment and Plan CC Problem List  Visit Diagnosis  ROS  Review (Popup)  Health Maintenance  Quality  BestPractice  Medications  SmartSets  SnapShot Encounters  Media  Problem List Items Addressed This Visit        Unprioritized    Hypertension, benign    Overview     Controlled compliant.         Class 1 obesity due to excess calories with serious comorbidity and body mass index (BMI) of 30.0 to 30.9 in adult    Overview     Diet exercise and wt loss encouraged.   Techniques discussed.            Other Visit Diagnoses     Pain of right upper extremity    -  Primary    Relevant Orders    XR Elbow 2 View Right (Completed)    Lateral epicondylitis of right elbow        negative xray and exam consistent  Anti inflammatories ROM with ortho referral if no improvement    Relevant Medications "    meloxicam (MOBIC) 15 MG tablet          Follow Up Instructions Charge Capture  Follow-up Communications  Return if symptoms worsen or fail to improve.  Patient was given instructions and counseling regarding her condition or for health maintenance advice. Please see specific information pulled into the AVS if appropriate.

## 2021-07-10 LAB
CA-I SERPL ISE-MCNC: 5.3 MG/DL (ref 4.5–5.6)
CALCIUM SERPL-MCNC: 9.8 MG/DL (ref 8.7–10.2)
INTACT PTH: NORMAL
PTH-INTACT SERPL-MCNC: 31 PG/ML (ref 15–65)

## 2021-07-22 ENCOUNTER — PATIENT MESSAGE (OUTPATIENT)
Dept: FAMILY MEDICINE CLINIC | Facility: CLINIC | Age: 51
End: 2021-07-22

## 2021-07-29 ENCOUNTER — PATIENT MESSAGE (OUTPATIENT)
Dept: FAMILY MEDICINE CLINIC | Facility: CLINIC | Age: 51
End: 2021-07-29

## 2021-07-30 ENCOUNTER — TELEPHONE (OUTPATIENT)
Dept: FAMILY MEDICINE CLINIC | Facility: CLINIC | Age: 51
End: 2021-07-30

## 2021-07-30 PROCEDURE — U0003 INFECTIOUS AGENT DETECTION BY NUCLEIC ACID (DNA OR RNA); SEVERE ACUTE RESPIRATORY SYNDROME CORONAVIRUS 2 (SARS-COV-2) (CORONAVIRUS DISEASE [COVID-19]), AMPLIFIED PROBE TECHNIQUE, MAKING USE OF HIGH THROUGHPUT TECHNOLOGIES AS DESCRIBED BY CMS-2020-01-R: HCPCS | Performed by: NURSE PRACTITIONER

## 2021-07-30 NOTE — TELEPHONE ENCOUNTER
Spoke with Nancy she has been taking care of parent that are covid positve, she now has fever and not feeling well.

## 2021-08-07 DIAGNOSIS — I10 HYPERTENSION, BENIGN: ICD-10-CM

## 2021-08-09 RX ORDER — ATENOLOL 25 MG/1
TABLET ORAL
Qty: 180 TABLET | Refills: 1 | Status: SHIPPED | OUTPATIENT
Start: 2021-08-09 | End: 2022-01-28

## 2021-09-21 ENCOUNTER — OFFICE VISIT (OUTPATIENT)
Dept: FAMILY MEDICINE CLINIC | Facility: CLINIC | Age: 51
End: 2021-09-21

## 2021-09-21 VITALS
HEIGHT: 64 IN | TEMPERATURE: 97.7 F | BODY MASS INDEX: 29.53 KG/M2 | DIASTOLIC BLOOD PRESSURE: 80 MMHG | SYSTOLIC BLOOD PRESSURE: 132 MMHG | OXYGEN SATURATION: 95 % | WEIGHT: 173 LBS | RESPIRATION RATE: 18 BRPM | HEART RATE: 77 BPM

## 2021-09-21 DIAGNOSIS — M79.672 LEFT FOOT PAIN: ICD-10-CM

## 2021-09-21 DIAGNOSIS — M72.2 PLANTAR FASCIITIS: ICD-10-CM

## 2021-09-21 DIAGNOSIS — E66.09 CLASS 1 OBESITY DUE TO EXCESS CALORIES WITH SERIOUS COMORBIDITY AND BODY MASS INDEX (BMI) OF 30.0 TO 30.9 IN ADULT: ICD-10-CM

## 2021-09-21 DIAGNOSIS — N95.2 ATROPHIC VAGINITIS: Primary | ICD-10-CM

## 2021-09-21 DIAGNOSIS — I10 HYPERTENSION, BENIGN: ICD-10-CM

## 2021-09-21 PROCEDURE — 99213 OFFICE O/P EST LOW 20 MIN: CPT | Performed by: FAMILY MEDICINE

## 2021-09-21 NOTE — PROGRESS NOTES
Chief Complaint  Vaginal Bleeding, Foot Pain, and Hypertension    Subjective     CC  Problem List  Visit Diagnosis   Encounters  Notes  Medications  Labs  Result Review Imaging  Media    Nancy Marcano presents to [facility] for   Nancy Marcano declines flu vaccine at this time. The protection afforded vs the risk of life threatening secondary infection was explained. She is encouraged to reconsider.    Vaginal Bleeding:  Nancy Marcano is here today with complaints of vaginal bleeding after intercourse. Nancy reports that she will start bleeding at time of intercourse and will have spotting the next day or two. She is -1, Parity-1. LMP-2020 with hysterectomy She is postmenopausal. Onset was 2 month(s) ago. Associated symptoms include painful intercourse Pertinent surgical history includes hysterectomy.     Foot Pain:  The onset of the foot pain has been gradual starting 3 months ago. The course has been gradually increasing in severity. The foot pain is moderate. The foot pain is characterized as aching, burning and pulling. Pain is located heel  left. The foot pain is aggravated by certain ROM. The pain has been relieved by aspercreme. Current treatments include aspercreme.       Hypertension  This is a chronic problem. The current episode started more than 1 year ago. The problem is controlled. Pertinent negatives include no chest pain, headaches, orthopnea, palpitations, peripheral edema, PND, shortness of breath or sweats. Risk factors for coronary artery disease include post-menopausal state, obesity and family history. Current antihypertension treatment includes beta blockers. The current treatment provides significant improvement.       Review of Systems   Constitutional: Negative for fever, unexpected weight gain and unexpected weight loss.   Respiratory: Negative for shortness of breath.    Cardiovascular: Negative  "for chest pain, palpitations, orthopnea and PND.   Gastrointestinal: Negative for abdominal pain, diarrhea, nausea and vomiting.   Endocrine: Negative for cold intolerance, heat intolerance, polydipsia and polyuria.   Genitourinary: Positive for vaginal bleeding (after intercoarse.  she is s/p hysterectomy, for heavy bleeding and anemia). Negative for dysuria and pelvic pain.   Musculoskeletal: Positive for arthralgias (left foot pain about the heal of 1 mo duration modeate and worsening. No hx of trauma.  ). Negative for joint swelling and myalgias.        Objective   Vital Signs:   /80 (BP Location: Left arm, Patient Position: Sitting, Cuff Size: Adult)   Pulse 77   Temp 97.7 °F (36.5 °C) (Temporal)   Resp 18   Ht 162.6 cm (64\")   Wt 78.5 kg (173 lb)   SpO2 95% Comment: Room air  BMI 29.70 kg/m²     Physical Exam  Constitutional:       General: She is not in acute distress.  Neck:      Thyroid: No thyromegaly.   Cardiovascular:      Rate and Rhythm: Normal rate and regular rhythm.      Pulses: Normal pulses.           Dorsalis pedis pulses are 2+ on the right side and 2+ on the left side.      Heart sounds: Normal heart sounds. No murmur heard.        Comments: Negative Darrain's  Pulmonary:      Effort: Pulmonary effort is normal.      Breath sounds: Normal breath sounds.   Genitourinary:     General: Normal vulva.      Vagina: No tenderness, bleeding (The cuff appears normal ) or lesions.      Uterus: Absent.       Adnexa:         Right: No mass or tenderness.          Left: No mass or tenderness.     Musculoskeletal:      Cervical back: Neck supple.      Right lower leg: No edema.      Left lower leg: No edema.      Left foot: Normal range of motion. Tenderness (There is tenderness over the insetion of the achilles tendon. ) present. No swelling, deformity or bunion. Normal pulse.   Lymphadenopathy:      Cervical: No cervical adenopathy.   Skin:     Findings: No rash.   Neurological:      Mental " Status: She is alert.        Result Review :Labs  Result Review  Imaging  Med Tab  Media                 Assessment and Plan CC Problem List  Visit Diagnosis  ROS  Review (Popup)  Health Maintenance  Quality  BestPractice  Medications  SmartSets  SnapShot Encounters  Media  Problem List Items Addressed This Visit        Unprioritized    Hypertension, benign    Overview     Controlled compliant         Class 1 obesity due to excess calories with serious comorbidity and body mass index (BMI) of 30.0 to 30.9 in adult    Overview     Diet exercise and wt loss encouraged.   Techniques discussed.            Other Visit Diagnoses     Atrophic vaginitis    -  Primary    mild with bleeding after intercourse. Reassurance and follow up if sxs continue over 1 mo.     Left foot pain        Plantar fasciitis        Ice heel cord lengthening exercise OTC Aleve padded shoe changed frequently and follow up should sxs not gradually improved and resolve.           Follow Up Instructions Charge Capture  Follow-up Communications  Return if symptoms worsen or fail to improve.  Patient was given instructions and counseling regarding her condition or for health maintenance advice. Please see specific information pulled into the AVS if appropriate.

## 2022-01-04 ENCOUNTER — OFFICE VISIT (OUTPATIENT)
Dept: FAMILY MEDICINE CLINIC | Facility: CLINIC | Age: 52
End: 2022-01-04

## 2022-01-04 VITALS
SYSTOLIC BLOOD PRESSURE: 120 MMHG | WEIGHT: 180 LBS | BODY MASS INDEX: 30.73 KG/M2 | HEART RATE: 88 BPM | TEMPERATURE: 97.7 F | OXYGEN SATURATION: 98 % | HEIGHT: 64 IN | RESPIRATION RATE: 18 BRPM | DIASTOLIC BLOOD PRESSURE: 82 MMHG

## 2022-01-04 DIAGNOSIS — E66.09 CLASS 1 OBESITY DUE TO EXCESS CALORIES WITH SERIOUS COMORBIDITY AND BODY MASS INDEX (BMI) OF 30.0 TO 30.9 IN ADULT: ICD-10-CM

## 2022-01-04 DIAGNOSIS — I10 HYPERTENSION, BENIGN: ICD-10-CM

## 2022-01-04 DIAGNOSIS — N30.90 CYSTITIS: ICD-10-CM

## 2022-01-04 DIAGNOSIS — R35.0 URINARY FREQUENCY: Primary | ICD-10-CM

## 2022-01-04 LAB
BILIRUB BLD-MCNC: NEGATIVE MG/DL
CLARITY, POC: CLEAR
COLOR UR: YELLOW
GLUCOSE UR STRIP-MCNC: NEGATIVE MG/DL
KETONES UR QL: NEGATIVE
LEUKOCYTE EST, POC: NEGATIVE
NITRITE UR-MCNC: NEGATIVE MG/ML
PH UR: 5 [PH] (ref 5–8)
PROT UR STRIP-MCNC: NEGATIVE MG/DL
RBC # UR STRIP: NEGATIVE /UL
SP GR UR: 1.01 (ref 1–1.03)
UROBILINOGEN UR QL: NORMAL

## 2022-01-04 PROCEDURE — 81002 URINALYSIS NONAUTO W/O SCOPE: CPT | Performed by: FAMILY MEDICINE

## 2022-01-04 PROCEDURE — 99213 OFFICE O/P EST LOW 20 MIN: CPT | Performed by: FAMILY MEDICINE

## 2022-01-04 RX ORDER — SULFAMETHOXAZOLE AND TRIMETHOPRIM 800; 160 MG/1; MG/1
1 TABLET ORAL 2 TIMES DAILY
Qty: 20 TABLET | Refills: 0 | Status: SHIPPED | OUTPATIENT
Start: 2022-01-04 | End: 2022-05-13

## 2022-01-04 NOTE — PROGRESS NOTES
Chief Complaint  Urinary Frequency and Hypertension    Subjective     CC  Problem List  Visit Diagnosis   Encounters  Notes  Medications  Labs  Result Review Imaging  Media    Nancy Marcano presents to [facility] for   Urinary Frequency   This is a new problem. The current episode started today. The problem occurs every urination. The problem has been unchanged. The quality of the pain is described as aching. The patient is experiencing no pain. There has been no fever. Associated symptoms include frequency and urgency. Pertinent negatives include no chills, discharge, flank pain, hesitancy, nausea, sweats or vomiting. She has tried nothing for the symptoms. The treatment provided no relief.   Hypertension  This is a chronic problem. The current episode started more than 1 year ago. The problem is controlled. Pertinent negatives include no chest pain, headaches, orthopnea, palpitations, peripheral edema, PND, shortness of breath or sweats. Risk factors for coronary artery disease include dyslipidemia, post-menopausal state and family history. Current antihypertension treatment includes beta blockers. The current treatment provides moderate improvement.       Review of Systems   Constitutional: Negative for chills, fatigue, fever, unexpected weight gain and unexpected weight loss.   Respiratory: Negative for shortness of breath.    Cardiovascular: Negative for chest pain, palpitations, orthopnea and PND.        Elevated BP on several occasions.    Gastrointestinal: Negative for abdominal pain, diarrhea, nausea and vomiting.   Genitourinary: Positive for frequency and urgency. Negative for dysuria, flank pain, hesitancy and pelvic pain.   Musculoskeletal: Negative for arthralgias.   Hematological: Negative for adenopathy. Does not bruise/bleed easily.   Psychiatric/Behavioral: Negative for suicidal ideas. The patient is nervous/anxious (mild).         Objective  "  Vital Signs:   /82 (BP Location: Right arm, Patient Position: Sitting, Cuff Size: Adult)   Pulse 88   Temp 97.7 °F (36.5 °C) (Temporal)   Resp 18   Ht 162.6 cm (64\")   Wt 81.6 kg (180 lb)   SpO2 98% Comment: room air  BMI 30.90 kg/m²     Physical Exam  Constitutional:       General: She is not in acute distress.  Cardiovascular:      Rate and Rhythm: Normal rate and regular rhythm.      Heart sounds: No murmur heard.      Pulmonary:      Effort: Pulmonary effort is normal.      Breath sounds: Normal breath sounds. No wheezing.   Abdominal:      General: Abdomen is flat.      Palpations: Abdomen is soft.      Tenderness: There is left CVA tenderness. There is no right CVA tenderness.   Musculoskeletal:      Cervical back: Neck supple.   Lymphadenopathy:      Cervical: No cervical adenopathy.   Neurological:      Mental Status: She is alert and oriented to person, place, and time.      Sensory: No sensory deficit.      Motor: No weakness.      Coordination: Coordination normal.      Gait: Gait normal.      Deep Tendon Reflexes: Reflexes normal.   Psychiatric:         Mood and Affect: Mood normal.        Result Review :Labs  Result Review  Imaging  Med Tab  Media                 Assessment and Plan CC Problem List  Visit Diagnosis  ROS  Review (Popup)  Health Maintenance  Quality  BestPractice  Medications  SmartSets  SnapShot Encounters  Media  Problem List Items Addressed This Visit        Unprioritized    Hypertension, benign    Overview     Controlled compliant.         Current Assessment & Plan     Hypertension is improving with treatment.  Continue current treatment regimen.  Weight loss.  Regular aerobic exercise.  Blood pressure will be reassessed in 4 weeks.         Class 1 obesity due to excess calories with serious comorbidity and body mass index (BMI) of 30.0 to 30.9 in adult    Overview     Diet exercise and wt loss encouraged.   Techniques discussed.            Other Visit " Diagnoses     Urinary frequency    -  Primary    Relevant Orders    POCT urinalysis dipstick, manual (Completed)    Urine Culture - Urine, Urine, Clean Catch (Completed)    Cystitis        possible, abx pending cx, notify her of cx results. Neg exam neg u/a    Relevant Medications    sulfamethoxazole-trimethoprim (BACTRIM DS,SEPTRA DS) 800-160 MG per tablet          Follow Up Instructions Charge Capture  Follow-up Communications  Return if symptoms worsen or fail to improve.  Patient was given instructions and counseling regarding her condition or for health maintenance advice. Please see specific information pulled into the AVS if appropriate.

## 2022-01-06 LAB
BACTERIA UR CULT: NORMAL
BACTERIA UR CULT: NORMAL

## 2022-01-16 NOTE — ASSESSMENT & PLAN NOTE
Hypertension is improving with treatment.  Continue current treatment regimen.  Weight loss.  Regular aerobic exercise.  Blood pressure will be reassessed in 4 weeks.

## 2022-01-28 DIAGNOSIS — I10 HYPERTENSION, BENIGN: ICD-10-CM

## 2022-01-28 RX ORDER — ATENOLOL 25 MG/1
TABLET ORAL
Qty: 180 TABLET | Refills: 1 | Status: SHIPPED | OUTPATIENT
Start: 2022-01-28 | End: 2022-05-13 | Stop reason: SDUPTHER

## 2022-02-07 DIAGNOSIS — J30.1 CHRONIC SEASONAL ALLERGIC RHINITIS DUE TO POLLEN: ICD-10-CM

## 2022-02-08 RX ORDER — MONTELUKAST SODIUM 10 MG/1
TABLET ORAL
Qty: 90 TABLET | Refills: 3 | Status: SHIPPED | OUTPATIENT
Start: 2022-02-08 | End: 2022-05-13 | Stop reason: SDUPTHER

## 2022-05-10 PROBLEM — Z96.1 PSEUDOPHAKIA: Status: ACTIVE | Noted: 2017-11-30

## 2022-05-10 PROBLEM — H35.379 EPIRETINAL MEMBRANE: Status: ACTIVE | Noted: 2018-06-11

## 2022-05-10 PROBLEM — H44.20 DEGENERATIVE PROGRESSIVE HIGH MYOPIA: Status: ACTIVE | Noted: 2017-06-19

## 2022-05-10 PROBLEM — H43.819 POSTERIOR VITREOUS DETACHMENT: Status: ACTIVE | Noted: 2017-10-23

## 2022-05-13 ENCOUNTER — OFFICE VISIT (OUTPATIENT)
Dept: FAMILY MEDICINE CLINIC | Facility: CLINIC | Age: 52
End: 2022-05-13

## 2022-05-13 VITALS
HEIGHT: 65 IN | RESPIRATION RATE: 16 BRPM | BODY MASS INDEX: 30.06 KG/M2 | WEIGHT: 180.4 LBS | OXYGEN SATURATION: 98 % | SYSTOLIC BLOOD PRESSURE: 122 MMHG | DIASTOLIC BLOOD PRESSURE: 80 MMHG | TEMPERATURE: 97.8 F | HEART RATE: 76 BPM

## 2022-05-13 DIAGNOSIS — G89.29 CHRONIC RIGHT SHOULDER PAIN: ICD-10-CM

## 2022-05-13 DIAGNOSIS — R92.2 DENSE BREAST: ICD-10-CM

## 2022-05-13 DIAGNOSIS — Z12.31 ENCOUNTER FOR SCREENING MAMMOGRAM FOR MALIGNANT NEOPLASM OF BREAST: ICD-10-CM

## 2022-05-13 DIAGNOSIS — M19.011 PRIMARY OSTEOARTHRITIS OF RIGHT SHOULDER: ICD-10-CM

## 2022-05-13 DIAGNOSIS — E66.09 CLASS 1 OBESITY DUE TO EXCESS CALORIES WITH SERIOUS COMORBIDITY AND BODY MASS INDEX (BMI) OF 30.0 TO 30.9 IN ADULT: ICD-10-CM

## 2022-05-13 DIAGNOSIS — J30.1 CHRONIC SEASONAL ALLERGIC RHINITIS DUE TO POLLEN: ICD-10-CM

## 2022-05-13 DIAGNOSIS — I10 HYPERTENSION, BENIGN: ICD-10-CM

## 2022-05-13 DIAGNOSIS — K21.9 GASTROESOPHAGEAL REFLUX DISEASE WITHOUT ESOPHAGITIS: ICD-10-CM

## 2022-05-13 DIAGNOSIS — Z78.0 ASYMPTOMATIC MENOPAUSAL STATE: ICD-10-CM

## 2022-05-13 DIAGNOSIS — M25.511 CHRONIC RIGHT SHOULDER PAIN: ICD-10-CM

## 2022-05-13 DIAGNOSIS — E78.5 DYSLIPIDEMIA WITH ELEVATED LOW DENSITY LIPOPROTEIN (LDL) CHOLESTEROL AND ABNORMALLY LOW HIGH DENSITY LIPOPROTEIN CHOLESTEROL: ICD-10-CM

## 2022-05-13 DIAGNOSIS — J45.20 MILD INTERMITTENT ASTHMA WITHOUT COMPLICATION: ICD-10-CM

## 2022-05-13 DIAGNOSIS — Z12.4 CERVICAL CANCER SCREENING: ICD-10-CM

## 2022-05-13 DIAGNOSIS — Z00.01 ANNUAL VISIT FOR GENERAL ADULT MEDICAL EXAMINATION WITH ABNORMAL FINDINGS: Primary | ICD-10-CM

## 2022-05-13 DIAGNOSIS — Z12.11 COLON CANCER SCREENING: ICD-10-CM

## 2022-05-13 PROBLEM — N92.6 IRREGULAR MENSES: Status: RESOLVED | Noted: 2020-03-04 | Resolved: 2022-05-13

## 2022-05-13 LAB
BILIRUB BLD-MCNC: NEGATIVE MG/DL
CLARITY, POC: CLEAR
COLOR UR: YELLOW
GLUCOSE UR STRIP-MCNC: NEGATIVE MG/DL
KETONES UR QL: NEGATIVE
LEUKOCYTE EST, POC: NEGATIVE
NITRITE UR-MCNC: NEGATIVE MG/ML
PH UR: 6 [PH] (ref 5–8)
PROT UR STRIP-MCNC: NEGATIVE MG/DL
RBC # UR STRIP: NEGATIVE /UL
SP GR UR: 1.02 (ref 1–1.03)
UROBILINOGEN UR QL: NORMAL

## 2022-05-13 PROCEDURE — 99396 PREV VISIT EST AGE 40-64: CPT | Performed by: FAMILY MEDICINE

## 2022-05-13 PROCEDURE — 81002 URINALYSIS NONAUTO W/O SCOPE: CPT | Performed by: FAMILY MEDICINE

## 2022-05-13 PROCEDURE — 99214 OFFICE O/P EST MOD 30 MIN: CPT | Performed by: FAMILY MEDICINE

## 2022-05-13 RX ORDER — OMEPRAZOLE 20 MG/1
20 CAPSULE, DELAYED RELEASE ORAL DAILY
Qty: 90 CAPSULE | Refills: 3 | Status: SHIPPED | OUTPATIENT
Start: 2022-05-13

## 2022-05-13 RX ORDER — ATENOLOL 25 MG/1
25 TABLET ORAL 2 TIMES DAILY
Qty: 180 TABLET | Refills: 3 | Status: SHIPPED | OUTPATIENT
Start: 2022-05-13

## 2022-05-13 RX ORDER — MONTELUKAST SODIUM 10 MG/1
10 TABLET ORAL DAILY
Qty: 90 TABLET | Refills: 3 | Status: SHIPPED | OUTPATIENT
Start: 2022-05-13

## 2022-05-13 NOTE — PROGRESS NOTES
Chief Complaint  Annual Exam, Shoulder Pain, Hypertension, and Hyperlipidemia    Subjective     CC  Problem List  Visit Diagnosis   Encounters  Notes  Medications  Labs  Result Review Imaging  Media    Nancy Marcano presents to [facility] for an annual exam. The patient is here: for coordination of medical care to discuss health maintenance and disease prevention to follow up on multiple medical problems. Overall has: moderate activity with work/home activities, good appetite, feels well with minor complaints, good energy level and is sleeping well. Nutrition: balanced diet. Last tetanus shot was 2016.     Hypertension  This is a chronic problem. The current episode started more than 1 year ago. The problem is controlled. Pertinent negatives include no chest pain, headaches, orthopnea, palpitations, peripheral edema, PND, shortness of breath or sweats. Risk factors for coronary artery disease include dyslipidemia, family history, obesity and post-menopausal state. Current antihypertension treatment includes beta blockers. The current treatment provides moderate improvement.   Hyperlipidemia  This is a chronic problem. The current episode started more than 1 year ago. The problem is controlled. Recent lipid tests were reviewed and are high. Exacerbating diseases include obesity. She has no history of diabetes. Pertinent negatives include no chest pain, myalgias or shortness of breath. Current antihyperlipidemic treatment includes herbal therapy. The current treatment provides moderate improvement of lipids. Risk factors for coronary artery disease include dyslipidemia, family history, obesity, hypertension and post-menopausal.   Shoulder Injury   The right shoulder is affected. The incident occurred more than 1 week ago (A couple of months). There was no injury mechanism. The quality of the pain is described as stabbing, shooting and burning. The pain radiates to  "the right arm. The pain is at a severity of 8/10. The pain is moderate. Pertinent negatives include no chest pain, numbness or tingling. The symptoms are aggravated by movement and overhead lifting (pulling). She has tried NSAIDs for the symptoms. The treatment provided no relief.       Review of Systems   Constitutional: Negative for activity change, appetite change, fatigue, fever, unexpected weight gain and unexpected weight loss.   HENT: Negative for congestion, ear pain, hearing loss, rhinorrhea, sinus pressure, sore throat, swollen glands, trouble swallowing and voice change.    Eyes: Negative for visual disturbance.   Respiratory: Negative for apnea, cough, shortness of breath and wheezing.    Cardiovascular: Negative for chest pain, palpitations, orthopnea and PND.   Gastrointestinal: Negative for abdominal pain, blood in stool, constipation, diarrhea, nausea, vomiting and indigestion.   Endocrine: Negative for cold intolerance, heat intolerance, polydipsia and polyuria.   Genitourinary: Negative for breast discharge, breast lump, breast pain, dysuria, flank pain, frequency, pelvic pain and vaginal bleeding.   Musculoskeletal: Positive for arthralgias (multiple, exacerbation right shoulder of 1 yr duration mild to moderate and worsening. ). Negative for joint swelling and myalgias.   Skin: Negative for rash, skin lesions and wound.   Allergic/Immunologic: Negative for environmental allergies and immunocompromised state.   Neurological: Negative for dizziness, tingling, syncope, weakness, numbness, headache and memory problem.   Hematological: Negative for adenopathy. Does not bruise/bleed easily.   Psychiatric/Behavioral: Negative for suicidal ideas and depressed mood. The patient is not nervous/anxious.         Objective   Vital Signs:   /80 (BP Location: Right arm, Patient Position: Sitting, Cuff Size: Adult)   Pulse 76   Temp 97.8 °F (36.6 °C) (Temporal)   Resp 16   Ht 165.1 cm (65\")   Wt 81.8 " kg (180 lb 6.4 oz)   SpO2 98% Comment: Room air  BMI 30.02 kg/m²     Physical Exam  Constitutional:       General: She is not in acute distress.     Appearance: She is well-developed.   HENT:      Right Ear: External ear normal.      Left Ear: External ear normal.      Mouth/Throat:      Pharynx: No oropharyngeal exudate.   Eyes:      General:         Right eye: No discharge.         Left eye: No discharge.      Conjunctiva/sclera: Conjunctivae normal.      Pupils: Pupils are equal, round, and reactive to light.   Neck:      Thyroid: No thyromegaly.      Vascular: No JVD.   Cardiovascular:      Rate and Rhythm: Normal rate and regular rhythm.      Pulses:           Carotid pulses are 2+ on the right side and 2+ on the left side.       Femoral pulses are 2+ on the right side and 2+ on the left side.       Dorsalis pedis pulses are 2+ on the right side and 2+ on the left side.      Heart sounds: Normal heart sounds. No murmur heard.    No friction rub. No gallop.   Pulmonary:      Effort: Pulmonary effort is normal. No respiratory distress.      Breath sounds: Normal breath sounds. No stridor. No wheezing or rales.   Chest:      Chest wall: No tenderness.   Abdominal:      General: Bowel sounds are normal. There is no distension.      Palpations: Abdomen is soft. There is no mass.      Tenderness: There is no abdominal tenderness. There is no guarding or rebound.      Hernia: No hernia is present.   Musculoskeletal:         General: No deformity.      Right shoulder: Crepitus present. No swelling, deformity, tenderness or bony tenderness. Decreased range of motion (mild). Normal pulse.   Lymphadenopathy:      Cervical: No cervical adenopathy.   Skin:     Findings: No erythema or rash.   Neurological:      Mental Status: She is alert and oriented to person, place, and time.      Cranial Nerves: No cranial nerve deficit.      Sensory: No sensory deficit.      Motor: No abnormal muscle tone.      Coordination:  Coordination normal.      Deep Tendon Reflexes: Reflexes normal.   Psychiatric:         Behavior: Behavior normal.         Thought Content: Thought content normal.         Judgment: Judgment normal.        Result Review :Labs  Result Review  Imaging  Med Tab  Media                 Assessment and Plan CC Problem List  Visit Diagnosis  ROS  Review (Popup)  Health Maintenance  Quality  BestPractice  Medications  SmartSets  SnapShot Encounters  Media  Problem List Items Addressed This Visit        High    Hypertension, benign    Overview     Controlled compliant           Current Assessment & Plan     Hypertension is improving with treatment.  Continue current treatment regimen.  Dietary sodium restriction.  Weight loss.  Regular aerobic exercise.  Blood pressure will be reassessed in 1 yr.           Relevant Medications    atenolol (TENORMIN) 25 MG tablet    Other Relevant Orders    POCT urinalysis dipstick, manual (Completed)    CBC & Differential (Completed)    Comprehensive Metabolic Panel (Completed)    TSH (Completed)    Mild intermittent asthma without complication    Overview     Sxs are presently stable with prn meds rarely.           Relevant Medications    montelukast (SINGULAIR) 10 MG tablet       Medium    Dyslipidemia with elevated low density lipoprotein (LDL) cholesterol and abnormally low high density lipoprotein cholesterol    Overview     Stable diet and exercise discussed. Fish oil 1000 mg bid continued           Relevant Orders    Lipid Panel With / Chol / HDL Ratio (Completed)       Low    Chronic seasonal allergic rhinitis due to pollen    Overview     Stable on antihistamines           Relevant Medications    montelukast (SINGULAIR) 10 MG tablet    Dense breast    Overview     Nancy Marcano's 5 year risk of having breast cancer is 1.0%. The average woman at age 51 y.o. has a risk of 1.4% of having breast cancer.  Nancy Marcano's life time risk of having  breast cancer up to age 90 is 8.5%. The average woman's chance of having breast cancer up to the age of 90 is 10.8%.    We discussed the risk assessment values with Nancy MENDOZA Farrisen Jeet, she understands that she is at (less) than average risk of developing breast cancer at 5 years and over her life time than the average woman of her age. She does not desire to have genetic testing or further work up at this time.              Gastroesophageal reflux disease without esophagitis    Overview     Stable on meds which are continued           Relevant Medications    omeprazole (priLOSEC) 20 MG capsule    Class 1 obesity due to excess calories with serious comorbidity and body mass index (BMI) of 30.0 to 30.9 in adult    Overview     Diet exercise and wt loss encouraged.   Techniques discussed.            Current Assessment & Plan     Patient's (Body mass index is 30.02 kg/m².) indicates that they are obese (BMI >30) with health conditions that include hypertension, dyslipidemias and GERD . Weight is unchanged. BMI is is above average; BMI management plan is completed. We discussed portion control and increasing exercise.               Unprioritized    Annual visit for general adult medical examination with abnormal findings - Primary    Overview     Diet exercise wt loss, sunscreens seat belts, breast self exams and follow up in 1 yr or prn problems. Previous lab was discussed. We will notify her of today's lab           Encounter for screening mammogram for malignant neoplasm of breast    Overview     Last mammogram 03/10/2021   05/29/2019 Dr. Ryan Lagos Heterogeneously Dense           Cervical cancer screening    Overview     S/p LATOSHA BSO 12/22/2020 heavy bleeding and anemia  Last pap smear 05/29/2019 Dr. Davis negative           Asymptomatic menopausal state    Overview     S/p vaginal hystorectomy for menorrhagia and anemia, doing well   Last dexa scan 03/10/2021 repeat 5 years              Colon cancer  screening    Overview     cologuard 03/21/2021 negative repeat 3 years            Primary osteoarthritis of right shoulder    Overview     Likely, ice heat topical and po anti inflammatories, xray and further workup if sxs worsen or continue             Other Visit Diagnoses     Chronic right shoulder pain        Neg exam, ice heat anti inflammatoires po and topical xray possible MRI and referral if sxs continue or worsen.           Follow Up Instructions Charge Capture  Follow-up Communications  Return in about 1 year (around 5/13/2023), or prn problems or no improvement in shoulder pain.  Patient was given instructions and counseling regarding her condition or for health maintenance advice. Please see specific information pulled into the AVS if appropriate.      Site care done- cleaned with alcohol swab, procedure tolerated well, dressing applied. Venipuncture was obtained after 1 time(s). 2 tubes were drawn. Needle gauge used was 23-butterfly.

## 2022-05-13 NOTE — ASSESSMENT & PLAN NOTE
Patient's (Body mass index is 30.02 kg/m².) indicates that they are obese (BMI >30) with health conditions that include hypertension, dyslipidemias and GERD . Weight is unchanged. BMI is is above average; BMI management plan is completed. We discussed portion control and increasing exercise.

## 2022-05-14 LAB
ALBUMIN SERPL-MCNC: 4.2 G/DL (ref 3.8–4.9)
ALBUMIN/GLOB SERPL: 1.5 {RATIO} (ref 1.2–2.2)
ALP SERPL-CCNC: 98 IU/L (ref 44–121)
ALT SERPL-CCNC: 18 IU/L (ref 0–32)
AST SERPL-CCNC: 21 IU/L (ref 0–40)
BASOPHILS # BLD AUTO: 0.1 X10E3/UL (ref 0–0.2)
BASOPHILS NFR BLD AUTO: 2 %
BILIRUB SERPL-MCNC: 0.4 MG/DL (ref 0–1.2)
BUN SERPL-MCNC: 10 MG/DL (ref 6–24)
BUN/CREAT SERPL: 10 (ref 9–23)
CALCIUM SERPL-MCNC: 9 MG/DL (ref 8.7–10.2)
CHLORIDE SERPL-SCNC: 102 MMOL/L (ref 96–106)
CHOLEST SERPL-MCNC: 196 MG/DL (ref 100–199)
CHOLEST/HDLC SERPL: 4.3 RATIO (ref 0–4.4)
CO2 SERPL-SCNC: 20 MMOL/L (ref 20–29)
CREAT SERPL-MCNC: 1 MG/DL (ref 0.57–1)
EGFRCR SERPLBLD CKD-EPI 2021: 68 ML/MIN/1.73
EOSINOPHIL # BLD AUTO: 0.3 X10E3/UL (ref 0–0.4)
EOSINOPHIL NFR BLD AUTO: 4 %
ERYTHROCYTE [DISTWIDTH] IN BLOOD BY AUTOMATED COUNT: 12.5 % (ref 11.7–15.4)
GLOBULIN SER CALC-MCNC: 2.8 G/DL (ref 1.5–4.5)
GLUCOSE SERPL-MCNC: 86 MG/DL (ref 65–99)
HCT VFR BLD AUTO: 41.5 % (ref 34–46.6)
HDLC SERPL-MCNC: 46 MG/DL
HGB BLD-MCNC: 14.2 G/DL (ref 11.1–15.9)
IMM GRANULOCYTES # BLD AUTO: 0.1 X10E3/UL (ref 0–0.1)
IMM GRANULOCYTES NFR BLD AUTO: 1 %
LDLC SERPL CALC-MCNC: 128 MG/DL (ref 0–99)
LYMPHOCYTES # BLD AUTO: 2.5 X10E3/UL (ref 0.7–3.1)
LYMPHOCYTES NFR BLD AUTO: 31 %
MCH RBC QN AUTO: 31.3 PG (ref 26.6–33)
MCHC RBC AUTO-ENTMCNC: 34.2 G/DL (ref 31.5–35.7)
MCV RBC AUTO: 92 FL (ref 79–97)
MONOCYTES # BLD AUTO: 0.7 X10E3/UL (ref 0.1–0.9)
MONOCYTES NFR BLD AUTO: 9 %
NEUTROPHILS # BLD AUTO: 4.4 X10E3/UL (ref 1.4–7)
NEUTROPHILS NFR BLD AUTO: 53 %
PLATELET # BLD AUTO: 275 X10E3/UL (ref 150–450)
POTASSIUM SERPL-SCNC: 4.5 MMOL/L (ref 3.5–5.2)
PROT SERPL-MCNC: 7 G/DL (ref 6–8.5)
RBC # BLD AUTO: 4.53 X10E6/UL (ref 3.77–5.28)
SODIUM SERPL-SCNC: 140 MMOL/L (ref 134–144)
TRIGL SERPL-MCNC: 120 MG/DL (ref 0–149)
TSH SERPL DL<=0.005 MIU/L-ACNC: 1.99 UIU/ML (ref 0.45–4.5)
VLDLC SERPL CALC-MCNC: 22 MG/DL (ref 5–40)
WBC # BLD AUTO: 8.1 X10E3/UL (ref 3.4–10.8)

## 2022-08-16 DIAGNOSIS — M77.11 LATERAL EPICONDYLITIS OF RIGHT ELBOW: ICD-10-CM

## 2022-08-16 RX ORDER — MELOXICAM 15 MG/1
TABLET ORAL
Qty: 90 TABLET | Refills: 0 | Status: SHIPPED | OUTPATIENT
Start: 2022-08-16 | End: 2022-11-11

## 2022-11-11 DIAGNOSIS — M77.11 LATERAL EPICONDYLITIS OF RIGHT ELBOW: ICD-10-CM

## 2022-11-11 RX ORDER — MELOXICAM 15 MG/1
TABLET ORAL
Qty: 90 TABLET | Refills: 0 | Status: SHIPPED | OUTPATIENT
Start: 2022-11-11 | End: 2023-02-13

## 2023-02-12 DIAGNOSIS — M77.11 LATERAL EPICONDYLITIS OF RIGHT ELBOW: ICD-10-CM

## 2023-02-13 RX ORDER — MELOXICAM 15 MG/1
TABLET ORAL
Qty: 90 TABLET | Refills: 0 | Status: SHIPPED | OUTPATIENT
Start: 2023-02-13

## 2023-04-10 DIAGNOSIS — Z12.31 ENCOUNTER FOR SCREENING MAMMOGRAM FOR MALIGNANT NEOPLASM OF BREAST: Primary | ICD-10-CM

## 2023-04-10 DIAGNOSIS — J30.1 CHRONIC SEASONAL ALLERGIC RHINITIS DUE TO POLLEN: ICD-10-CM

## 2023-04-11 ENCOUNTER — TELEPHONE (OUTPATIENT)
Dept: FAMILY MEDICINE CLINIC | Facility: CLINIC | Age: 53
End: 2023-04-11
Payer: COMMERCIAL

## 2023-04-11 DIAGNOSIS — E78.5 DYSLIPIDEMIA WITH ELEVATED LOW DENSITY LIPOPROTEIN (LDL) CHOLESTEROL AND ABNORMALLY LOW HIGH DENSITY LIPOPROTEIN CHOLESTEROL: ICD-10-CM

## 2023-04-11 DIAGNOSIS — I10 HYPERTENSION, BENIGN: Primary | ICD-10-CM

## 2023-04-11 RX ORDER — ALBUTEROL SULFATE 90 UG/1
2 AEROSOL, METERED RESPIRATORY (INHALATION) EVERY 4 HOURS PRN
Qty: 18 G | Refills: 4 | Status: SHIPPED | OUTPATIENT
Start: 2023-04-11

## 2023-04-11 NOTE — TELEPHONE ENCOUNTER
Labs have been placed for her to get   She has been called and made aware that they have been placed in the chart and she can get them done the week of 5/17/2023

## 2023-04-11 NOTE — TELEPHONE ENCOUNTER
Caller: Nancy Mcelroy    Relationship: Self    Best call back number: [phone]    What orders are you requesting (i.e. lab or imaging): LABS FOR PHYSICAL    In what timeframe would the patient need to come in: A WEEK BEFORE 5.24.23    Where will you receive your lab/imaging services: [facility] [place]    Additional notes:     PLEASE CALL TO CONFIRM ONCE ORDERS ARE IN.

## 2023-05-14 DIAGNOSIS — M77.11 LATERAL EPICONDYLITIS OF RIGHT ELBOW: ICD-10-CM

## 2023-05-14 DIAGNOSIS — J30.1 CHRONIC SEASONAL ALLERGIC RHINITIS DUE TO POLLEN: ICD-10-CM

## 2023-05-14 LAB
ALBUMIN SERPL-MCNC: 4.4 G/DL (ref 3.8–4.9)
ALBUMIN/GLOB SERPL: 1.4 {RATIO} (ref 1.2–2.2)
ALP SERPL-CCNC: 99 IU/L (ref 44–121)
ALT SERPL-CCNC: 23 IU/L (ref 0–32)
AST SERPL-CCNC: 21 IU/L (ref 0–40)
BASOPHILS # BLD AUTO: 0.1 X10E3/UL (ref 0–0.2)
BASOPHILS NFR BLD AUTO: 1 %
BILIRUB SERPL-MCNC: 0.5 MG/DL (ref 0–1.2)
BUN SERPL-MCNC: 13 MG/DL (ref 6–24)
BUN/CREAT SERPL: 12 (ref 9–23)
CALCIUM SERPL-MCNC: 10.2 MG/DL (ref 8.7–10.2)
CHLORIDE SERPL-SCNC: 100 MMOL/L (ref 96–106)
CHOLEST SERPL-MCNC: 239 MG/DL (ref 100–199)
CHOLEST/HDLC SERPL: 5.3 RATIO (ref 0–4.4)
CO2 SERPL-SCNC: 24 MMOL/L (ref 20–29)
CREAT SERPL-MCNC: 1.07 MG/DL (ref 0.57–1)
EGFRCR SERPLBLD CKD-EPI 2021: 62 ML/MIN/1.73
EOSINOPHIL # BLD AUTO: 0.5 X10E3/UL (ref 0–0.4)
EOSINOPHIL NFR BLD AUTO: 6 %
ERYTHROCYTE [DISTWIDTH] IN BLOOD BY AUTOMATED COUNT: 12.2 % (ref 11.7–15.4)
GLOBULIN SER CALC-MCNC: 3.2 G/DL (ref 1.5–4.5)
GLUCOSE SERPL-MCNC: 110 MG/DL (ref 70–99)
HCT VFR BLD AUTO: 46.3 % (ref 34–46.6)
HDLC SERPL-MCNC: 45 MG/DL
HGB BLD-MCNC: 15.5 G/DL (ref 11.1–15.9)
IMM GRANULOCYTES # BLD AUTO: 0.1 X10E3/UL (ref 0–0.1)
IMM GRANULOCYTES NFR BLD AUTO: 1 %
LDLC SERPL CALC-MCNC: 167 MG/DL (ref 0–99)
LYMPHOCYTES # BLD AUTO: 3.3 X10E3/UL (ref 0.7–3.1)
LYMPHOCYTES NFR BLD AUTO: 39 %
MCH RBC QN AUTO: 30.9 PG (ref 26.6–33)
MCHC RBC AUTO-ENTMCNC: 33.5 G/DL (ref 31.5–35.7)
MCV RBC AUTO: 92 FL (ref 79–97)
MONOCYTES # BLD AUTO: 0.8 X10E3/UL (ref 0.1–0.9)
MONOCYTES NFR BLD AUTO: 9 %
NEUTROPHILS # BLD AUTO: 3.9 X10E3/UL (ref 1.4–7)
NEUTROPHILS NFR BLD AUTO: 44 %
PLATELET # BLD AUTO: 273 X10E3/UL (ref 150–450)
POTASSIUM SERPL-SCNC: 4.5 MMOL/L (ref 3.5–5.2)
PROT SERPL-MCNC: 7.6 G/DL (ref 6–8.5)
RBC # BLD AUTO: 5.01 X10E6/UL (ref 3.77–5.28)
SODIUM SERPL-SCNC: 140 MMOL/L (ref 134–144)
TRIGL SERPL-MCNC: 147 MG/DL (ref 0–149)
TSH SERPL DL<=0.005 MIU/L-ACNC: 2 UIU/ML (ref 0.45–4.5)
VLDLC SERPL CALC-MCNC: 27 MG/DL (ref 5–40)
WBC # BLD AUTO: 8.6 X10E3/UL (ref 3.4–10.8)

## 2023-05-15 RX ORDER — MELOXICAM 15 MG/1
TABLET ORAL
Qty: 90 TABLET | Refills: 0 | Status: SHIPPED | OUTPATIENT
Start: 2023-05-15

## 2023-05-15 RX ORDER — MONTELUKAST SODIUM 10 MG/1
TABLET ORAL
Qty: 90 TABLET | Refills: 3 | Status: SHIPPED | OUTPATIENT
Start: 2023-05-15

## 2023-05-24 ENCOUNTER — OFFICE VISIT (OUTPATIENT)
Dept: FAMILY MEDICINE CLINIC | Facility: CLINIC | Age: 53
End: 2023-05-24
Payer: COMMERCIAL

## 2023-05-24 ENCOUNTER — HOSPITAL ENCOUNTER (OUTPATIENT)
Dept: MAMMOGRAPHY | Facility: HOSPITAL | Age: 53
Discharge: HOME | End: 2023-05-24
Admitting: FAMILY MEDICINE
Payer: COMMERCIAL

## 2023-05-24 VITALS
OXYGEN SATURATION: 97 % | HEIGHT: 64 IN | BODY MASS INDEX: 31.07 KG/M2 | HEART RATE: 101 BPM | DIASTOLIC BLOOD PRESSURE: 82 MMHG | TEMPERATURE: 97.7 F | SYSTOLIC BLOOD PRESSURE: 128 MMHG | WEIGHT: 182 LBS | RESPIRATION RATE: 18 BRPM

## 2023-05-24 DIAGNOSIS — J45.20 MILD INTERMITTENT ASTHMA WITHOUT COMPLICATION: ICD-10-CM

## 2023-05-24 DIAGNOSIS — I10 HYPERTENSION, BENIGN: ICD-10-CM

## 2023-05-24 DIAGNOSIS — J30.1 CHRONIC SEASONAL ALLERGIC RHINITIS DUE TO POLLEN: ICD-10-CM

## 2023-05-24 DIAGNOSIS — K21.9 GASTROESOPHAGEAL REFLUX DISEASE WITHOUT ESOPHAGITIS: ICD-10-CM

## 2023-05-24 DIAGNOSIS — E78.5 DYSLIPIDEMIA WITH ELEVATED LOW DENSITY LIPOPROTEIN (LDL) CHOLESTEROL AND ABNORMALLY LOW HIGH DENSITY LIPOPROTEIN CHOLESTEROL: ICD-10-CM

## 2023-05-24 DIAGNOSIS — Z12.11 COLON CANCER SCREENING: ICD-10-CM

## 2023-05-24 DIAGNOSIS — Z00.01 ANNUAL VISIT FOR GENERAL ADULT MEDICAL EXAMINATION WITH ABNORMAL FINDINGS: Primary | ICD-10-CM

## 2023-05-24 DIAGNOSIS — R05.3 CHRONIC COUGH: ICD-10-CM

## 2023-05-24 DIAGNOSIS — M15.9 PRIMARY OSTEOARTHRITIS INVOLVING MULTIPLE JOINTS: ICD-10-CM

## 2023-05-24 DIAGNOSIS — Z12.31 ENCOUNTER FOR SCREENING MAMMOGRAM FOR MALIGNANT NEOPLASM OF BREAST: ICD-10-CM

## 2023-05-24 DIAGNOSIS — Z12.4 CERVICAL CANCER SCREENING: ICD-10-CM

## 2023-05-24 DIAGNOSIS — E66.09 CLASS 1 OBESITY DUE TO EXCESS CALORIES WITH SERIOUS COMORBIDITY AND BODY MASS INDEX (BMI) OF 30.0 TO 30.9 IN ADULT: ICD-10-CM

## 2023-05-24 DIAGNOSIS — R92.2 DENSE BREAST: ICD-10-CM

## 2023-05-24 DIAGNOSIS — B00.9 HERPES SIMPLEX: ICD-10-CM

## 2023-05-24 PROBLEM — M15.0 PRIMARY OSTEOARTHRITIS INVOLVING MULTIPLE JOINTS: Status: ACTIVE | Noted: 2023-05-24

## 2023-05-24 LAB
BILIRUB BLD-MCNC: NEGATIVE MG/DL
CLARITY, POC: CLEAR
COLOR UR: YELLOW
GLUCOSE UR STRIP-MCNC: NEGATIVE MG/DL
KETONES UR QL: NEGATIVE
LEUKOCYTE EST, POC: NEGATIVE
NITRITE UR-MCNC: NEGATIVE MG/ML
PH UR: 6 [PH] (ref 5–8)
PROT UR STRIP-MCNC: NEGATIVE MG/DL
RBC # UR STRIP: NEGATIVE /UL
SP GR UR: 1.01 (ref 1–1.03)
UROBILINOGEN UR QL: NORMAL

## 2023-05-24 PROCEDURE — 77063 BREAST TOMOSYNTHESIS BI: CPT

## 2023-05-24 PROCEDURE — 99213 OFFICE O/P EST LOW 20 MIN: CPT | Performed by: FAMILY MEDICINE

## 2023-05-24 PROCEDURE — 99396 PREV VISIT EST AGE 40-64: CPT | Performed by: FAMILY MEDICINE

## 2023-05-24 PROCEDURE — 81002 URINALYSIS NONAUTO W/O SCOPE: CPT | Performed by: FAMILY MEDICINE

## 2023-05-24 PROCEDURE — 77067 SCR MAMMO BI INCL CAD: CPT

## 2023-05-24 RX ORDER — ATENOLOL 25 MG/1
25 TABLET ORAL 2 TIMES DAILY
Qty: 180 TABLET | Refills: 3 | Status: SHIPPED | OUTPATIENT
Start: 2023-05-24

## 2023-05-24 RX ORDER — HYDROCODONE BITARTRATE AND HOMATROPINE METHYLBROMIDE ORAL SOLUTION 5; 1.5 MG/5ML; MG/5ML
5 LIQUID ORAL EVERY 6 HOURS PRN
Qty: 120 ML | Refills: 0 | Status: SHIPPED | OUTPATIENT
Start: 2023-05-24

## 2023-05-24 RX ORDER — OMEPRAZOLE 20 MG/1
20 CAPSULE, DELAYED RELEASE ORAL DAILY
Qty: 90 CAPSULE | Refills: 3 | Status: SHIPPED | OUTPATIENT
Start: 2023-05-24

## 2023-05-24 RX ORDER — ACYCLOVIR 800 MG/1
800 TABLET ORAL
Qty: 50 TABLET | Refills: 0 | Status: SHIPPED | OUTPATIENT
Start: 2023-05-24

## 2023-05-24 RX ORDER — MELOXICAM 15 MG/1
15 TABLET ORAL DAILY
Qty: 90 TABLET | Refills: 3 | Status: SHIPPED | OUTPATIENT
Start: 2023-05-24

## 2023-05-24 RX ORDER — ALBUTEROL SULFATE 90 UG/1
2 AEROSOL, METERED RESPIRATORY (INHALATION) EVERY 4 HOURS PRN
Qty: 18 G | Refills: 4 | Status: SHIPPED | OUTPATIENT
Start: 2023-05-24

## 2023-05-24 RX ORDER — CHLORAL HYDRATE 500 MG
1 CAPSULE ORAL 2 TIMES DAILY
Status: CANCELLED | OUTPATIENT
Start: 2023-05-24

## 2023-05-24 RX ORDER — MONTELUKAST SODIUM 10 MG/1
10 TABLET ORAL DAILY
Qty: 90 TABLET | Refills: 3 | Status: SHIPPED | OUTPATIENT
Start: 2023-05-24

## 2023-05-24 NOTE — ASSESSMENT & PLAN NOTE
Patient's (Body mass index is 31.24 kg/m².) indicates that they are obese (BMI >30) with health conditions that include hypertension . Weight is improving with treatment. BMI  is above average; BMI management plan is completed. We discussed low calorie, low carb based diet program, portion control and increasing exercise.

## 2023-05-24 NOTE — PROGRESS NOTES
Chief Complaint  Annual Exam, Hyperlipidemia, Hypertension, Obesity, and Allergies    Subjective     CC  Problem List  Visit Diagnosis   Encounters  Notes  Medications  Labs  Result Review Imaging  Media    Nancy Marcano presents to [facility] for an annual exam. The patient is here: for coordination of medical care to discuss health maintenance and disease prevention to follow up on multiple medical problems. Overall has: moderate activity with work/home activities, good appetite, feels well with minor complaints, good energy level and is sleeping well. Nutrition: appropriate balanced diet. Last tetanus shot was   . Nancy Marcano is -1, Parity-1. Patient's last menstrual period was 2020. She is premenopausal.      Hypertension  This is a chronic problem. The current episode started more than 1 year ago. The problem is controlled. Associated symptoms include sweats (night sweats). Pertinent negatives include no anxiety, blurred vision, chest pain, headaches, malaise/fatigue, neck pain, orthopnea, palpitations, peripheral edema, PND or shortness of breath. Risk factors for coronary artery disease include dyslipidemia, family history, obesity and post-menopausal state. Current antihypertension treatment includes beta blockers. The current treatment provides moderate improvement.   Hyperlipidemia  This is a chronic problem. The current episode started more than 1 year ago. The problem is controlled. Recent lipid tests were reviewed and are high. Exacerbating diseases include obesity. She has no history of diabetes. Pertinent negatives include no chest pain, focal sensory loss, focal weakness, leg pain, myalgias or shortness of breath. Current antihyperlipidemic treatment includes herbal therapy. The current treatment provides moderate improvement of lipids. Risk factors for coronary artery disease include dyslipidemia, family history,  "obesity, hypertension and post-menopausal.     Review of Systems   Constitutional:  Negative for activity change, appetite change, fatigue, fever, malaise/fatigue, unexpected weight gain and unexpected weight loss.   HENT:  Negative for congestion, ear pain, hearing loss, rhinorrhea, sinus pressure, sore throat, swollen glands, trouble swallowing and voice change.    Eyes:  Negative for blurred vision and visual disturbance.   Respiratory:  Negative for apnea, cough, shortness of breath and wheezing.    Cardiovascular:  Negative for chest pain, palpitations, orthopnea and PND.   Gastrointestinal:  Negative for abdominal pain, blood in stool, constipation, diarrhea, nausea, vomiting and indigestion.   Endocrine: Negative for cold intolerance, heat intolerance, polydipsia and polyuria.   Genitourinary:  Negative for breast discharge, breast lump, breast pain, dysuria, flank pain, frequency, pelvic pain and vaginal bleeding.   Musculoskeletal:  Positive for arthralgias (multiple). Negative for joint swelling, myalgias and neck pain.   Skin:  Negative for rash, skin lesions and wound.   Allergic/Immunologic: Negative for environmental allergies and immunocompromised state.   Neurological:  Negative for dizziness, focal weakness, syncope, weakness, numbness, headache and memory problem.   Hematological:  Negative for adenopathy. Does not bruise/bleed easily.   Psychiatric/Behavioral:  Negative for suicidal ideas and depressed mood. The patient is not nervous/anxious.       Objective   Vital Signs:   /82 (BP Location: Left arm, Patient Position: Sitting, Cuff Size: Adult)   Pulse 101   Temp 97.7 °F (36.5 °C) (Infrared)   Resp 18   Ht 162.6 cm (64\")   Wt 82.6 kg (182 lb)   SpO2 97% Comment: room air  BMI 31.24 kg/m²     Physical Exam  Constitutional:       General: She is not in acute distress.     Appearance: She is well-developed.   HENT:      Head: Normocephalic. Hair is normal.      Right Ear: Tympanic " membrane and external ear normal.      Left Ear: Tympanic membrane and external ear normal.      Nose: Nose normal. No mucosal edema.      Mouth/Throat:      Pharynx: Uvula midline.   Eyes:      General:         Right eye: No discharge.         Left eye: No discharge.      Conjunctiva/sclera: Conjunctivae normal.      Pupils: Pupils are equal, round, and reactive to light.   Neck:      Thyroid: No thyromegaly.      Vascular: No JVD.   Cardiovascular:      Rate and Rhythm: Normal rate and regular rhythm.      Chest Wall: PMI is not displaced.      Pulses:           Carotid pulses are 2+ on the right side and 2+ on the left side.       Femoral pulses are 2+ on the right side and 2+ on the left side.       Dorsalis pedis pulses are 2+ on the right side and 2+ on the left side.      Heart sounds: Normal heart sounds. No murmur heard.    No friction rub. No gallop.      Comments: Negative Homans' no edema  Pulmonary:      Effort: Pulmonary effort is normal. No respiratory distress.      Breath sounds: Normal breath sounds. No decreased breath sounds, wheezing, rhonchi or rales.   Chest:   Breasts:     Breasts are symmetrical.      Right: No inverted nipple, mass, nipple discharge, skin change or tenderness.      Left: No inverted nipple, mass, nipple discharge, skin change or tenderness.   Abdominal:      General: Bowel sounds are normal. There is no distension or abdominal bruit.      Palpations: Abdomen is soft. There is no mass.      Tenderness: There is no abdominal tenderness.   Musculoskeletal:         General: No tenderness or deformity. Normal range of motion.      Cervical back: Normal range of motion and neck supple. No muscular tenderness.   Lymphadenopathy:      Cervical: No cervical adenopathy.      Upper Body:      Right upper body: No supraclavicular adenopathy.      Left upper body: No supraclavicular adenopathy.   Skin:     General: Skin is warm and dry.      Findings: No ecchymosis, erythema, lesion or  rash.   Neurological:      Mental Status: She is alert and oriented to person, place, and time.      Cranial Nerves: No cranial nerve deficit.      Sensory: No sensory deficit.      Motor: No abnormal muscle tone.      Coordination: Coordination normal.      Deep Tendon Reflexes: Reflexes are normal and symmetric. Reflexes normal.   Psychiatric:         Speech: Speech normal.         Behavior: Behavior normal.         Thought Content: Thought content normal. Thought content does not include suicidal ideation.         Cognition and Memory: She does not exhibit impaired recent memory or impaired remote memory.         Judgment: Judgment normal. Judgment is not impulsive.      Result Review :Labs  Result Review  Imaging  Med Tab  Media                 Assessment and Plan CC Problem List  Visit Diagnosis  ROS  Review (Popup)  Health Maintenance  Quality  BestPractice  Medications  SmartSets  SnapShot Encounters  Media  Problem List Items Addressed This Visit          High    Hypertension    Overview     Controlled compliant         Current Assessment & Plan     Hypertension is improving with treatment.  Continue current treatment regimen.  Dietary sodium restriction.  Weight loss.  Regular aerobic exercise.  Blood pressure will be reassessed in 1 yr.         Relevant Medications    atenolol (TENORMIN) 25 MG tablet    Mild intermittent asthma without complication    Overview     Sxs are presently stable with prn meds rarely         Relevant Medications    albuterol sulfate  (90 Base) MCG/ACT inhaler    montelukast (SINGULAIR) 10 MG tablet       Medium    Dyslipidemia with elevated low density lipoprotein (LDL) cholesterol and abnormally low high density lipoprotein cholesterol    Overview     Stable diet and exercise discussed. Fish oil 1000 mg bid continued            Low    Chronic seasonal allergic rhinitis due to pollen    Overview     Stable on antihistamines         Relevant Medications     albuterol sulfate  (90 Base) MCG/ACT inhaler    meloxicam (MOBIC) 15 MG tablet    montelukast (SINGULAIR) 10 MG tablet    Dense breast    Overview     Nancy Marcano's 5 year risk of having breast cancer is 1.0%. The average woman at age 51 y.o. has a risk of 1.4% of having breast cancer.  Nancy Marcano's life time risk of having breast cancer up to age 90 is 8.5%. The average woman's chance of having breast cancer up to the age of 90 is 10.8%.    We discussed the risk assessment values with Nancy Marcano, she understands that she is at (less) than average risk of developing breast cancer at 5 years and over her life time than the average woman of her age. She does not desire to have genetic testing or further work up at this time.            Gastroesophageal reflux disease without esophagitis    Overview     Stable on meds which are continued.         Relevant Medications    omeprazole (priLOSEC) 20 MG capsule    Class 1 obesity due to excess calories with serious comorbidity and body mass index (BMI) of 30.0 to 30.9 in adult    Overview     Diet exercise and wt loss encouraged.   Techniques discussed.          Current Assessment & Plan     Patient's (Body mass index is 31.24 kg/m².) indicates that they are obese (BMI >30) with health conditions that include hypertension . Weight is improving with treatment. BMI  is above average; BMI management plan is completed. We discussed low calorie, low carb based diet program, portion control and increasing exercise.          Primary osteoarthritis involving multiple joints    Overview     Stable on prn OTC anti inflammatories which are continue sparingly.         Relevant Medications    meloxicam (MOBIC) 15 MG tablet       Unprioritized    Annual visit for general adult medical examination with abnormal findings - Primary    Overview     Healthy Diet exercise wt loss, sunscreens seat belts, breast self exams and follow up in 1 yr or  prn problems. Previous lab was discussed. We will notify her of today's lab         Relevant Orders    POCT urinalysis dipstick, manual (Completed)    Encounter for screening mammogram for malignant neoplasm of breast    Overview     Last mammogram 05/24/2023   03/10/2021   05/29/2019 Dr. Ryan Lagos Heterogeneously Dense         Cervical cancer screening    Overview     S/p LATOSHA BSO 12/22/2020 heavy bleeding and anemia.  Last pap smear 05/29/2019 Dr. Davis negative         Colon cancer screening    Overview     cologuard 03/21/2021 negative repeat 3 years.          Other Visit Diagnoses       Herpes simplex        Controlled with prn meds posterior RLE    Relevant Medications    acyclovir (ZOVIRAX) 800 MG tablet    Chronic cough        secondary to allergies, request med for prn rare use refilled.    Relevant Medications    HYDROcodone Bit-Homatrop MBr (HYCODAN) 5-1.5 MG/5ML solution            Follow Up Instructions Charge Capture  Follow-up Communications  Return in about 1 year (around 5/24/2024).  Patient was given instructions and counseling regarding her condition or for health maintenance advice. Please see specific information pulled into the AVS if appropriate.

## 2023-05-25 NOTE — PROGRESS NOTES
[employer] message was sent   Good morning we have your mammogram back and per Dr. Galeano   The breasts are heterogenously dense,There are no suspicious masses  No mammographic signs of malignancy. Recommend routine mammographic  Screening in 1 year.

## 2023-08-15 DIAGNOSIS — E78.5 DYSLIPIDEMIA WITH ELEVATED LOW DENSITY LIPOPROTEIN (LDL) CHOLESTEROL AND ABNORMALLY LOW HIGH DENSITY LIPOPROTEIN CHOLESTEROL: ICD-10-CM

## 2023-08-15 DIAGNOSIS — I10 HYPERTENSION, BENIGN: Primary | ICD-10-CM

## 2023-08-15 DIAGNOSIS — R73.9 HYPERGLYCEMIA: ICD-10-CM

## 2023-08-19 LAB
CHOLEST SERPL-MCNC: 196 MG/DL (ref 100–199)
CHOLEST/HDLC SERPL: 4.2 RATIO (ref 0–4.4)
HBA1C MFR BLD: 5.8 % (ref 4.8–5.6)
HDLC SERPL-MCNC: 47 MG/DL
LDLC SERPL CALC-MCNC: 130 MG/DL (ref 0–99)
TRIGL SERPL-MCNC: 104 MG/DL (ref 0–149)
VLDLC SERPL CALC-MCNC: 19 MG/DL (ref 5–40)

## 2023-08-21 NOTE — PROGRESS NOTES
[employer] message was sent   Good afternoon we have your lab back and per Dr. Galeano   Your total cholesterol was at 196 and this has decreased as it was 197 the last time, your triglycerides was at 104 and this has decreased as it was 134 the last time, your HDL ( healthy cholesterol) was at 47 and this has has maintained the same as this is what it was the last time it was checked ( we want this to be as high as we can get it and we do this by exercise, exercise ) your LDL ( lousy cholesterol) was at 130 and this has increased as it was 126 the last time and your T.chol/ cardiac ratio was at 4.2 and this has maintained as this is what it was the las time. Continue working on diet and exercise and taking the fish oil   Your hemoglobin A1c has come back at 5.8 and this is in the pre-diabetic range. ( Pre-diabetes is 5.7-6.4) Continue to work on diet and exercise as well as we do not want this to increase more. It would be best for us to recheck both of these again in at least 6 months to make sure that it has not increased anymore. We can do this in a visit with her. If you let me know of a time frame ( date and time ) that works well for you we can take a look at the schedule and get you scheduled.

## 2024-02-16 DIAGNOSIS — I10 PRIMARY HYPERTENSION: Primary | ICD-10-CM

## 2024-02-16 DIAGNOSIS — R73.9 HYPERGLYCEMIA: ICD-10-CM

## 2024-02-16 DIAGNOSIS — E78.5 DYSLIPIDEMIA WITH ELEVATED LOW DENSITY LIPOPROTEIN (LDL) CHOLESTEROL AND ABNORMALLY LOW HIGH DENSITY LIPOPROTEIN CHOLESTEROL: ICD-10-CM

## 2024-03-13 NOTE — PROGRESS NOTES
Chief Complaint  No chief complaint on file.    Subjective     CC  Problem List  Visit Diagnosis   Encounters  Notes  Medications  Labs  Result Review Imaging  Media    Nancy Marcano presents to [facility] for   History of Present Illness  Camila would like to discuss the results from recent blood work done.     She following a keto diet with a 50 lb wt loss. She feels improved.   Hypertension  This is a chronic problem. The current episode started more than 1 year ago. The problem is controlled. Associated symptoms include sweats (night sweats). Pertinent negatives include no anxiety, blurred vision, chest pain, headaches, malaise/fatigue, neck pain, orthopnea, palpitations, peripheral edema, PND or shortness of breath. Risk factors for coronary artery disease include dyslipidemia, family history, obesity and post-menopausal state. Current antihypertension treatment includes beta blockers. The current treatment provides moderate improvement. There are no compliance problems.    Hyperlipidemia  This is a chronic problem. The current episode started more than 1 year ago. The problem is controlled. Recent lipid tests were reviewed and are high. Exacerbating diseases include obesity. She has no history of diabetes. Factors aggravating her hyperlipidemia include fatty foods. Pertinent negatives include no chest pain, focal sensory loss, focal weakness, leg pain, myalgias or shortness of breath. Current antihyperlipidemic treatment includes herbal therapy. The current treatment provides moderate improvement of lipids. Risk factors for coronary artery disease include dyslipidemia, family history, obesity, hypertension and post-menopausal.       Review of Systems   Constitutional:  Positive for appetite change. Negative for fatigue, fever, malaise/fatigue and unexpected weight loss (She has lost 50 lbs on the keto diet).        She reports feeling very well after wt loss.     Eyes:  Negative for blurred vision.   Respiratory:  Negative for shortness of breath.    Cardiovascular:  Negative for chest pain, palpitations, orthopnea and PND.   Endocrine: Negative for cold intolerance, heat intolerance, polydipsia and polyuria.   Genitourinary:  Negative for dysuria.   Musculoskeletal:  Negative for myalgias and neck pain.   Skin:  Negative for rash.   Neurological:  Negative for focal weakness.   Hematological:  Negative for adenopathy. Does not bruise/bleed easily.        Objective   Vital Signs:   There were no vitals taken for this visit.    Physical Exam  Constitutional:       General: She is not in acute distress.  Cardiovascular:      Rate and Rhythm: Normal rate and regular rhythm.      Heart sounds: No murmur heard.  Pulmonary:      Effort: Pulmonary effort is normal.      Breath sounds: Normal breath sounds. No wheezing.   Musculoskeletal:      Cervical back: Neck supple.      Right lower leg: No edema.      Left lower leg: No edema.   Lymphadenopathy:      Cervical: No cervical adenopathy.   Neurological:      Mental Status: She is alert.        Result Review :Labs  Result Review  Imaging  Med Tab  Media                 Assessment and Plan CC Problem List  Visit Diagnosis  ROS  Review (Popup)  Health Maintenance  Quality  BestPractice  Medications  SmartSets  SnapShot Encounters  Media  Problem List Items Addressed This Visit    None      Follow Up Instructions Charge Capture  Follow-up Communications  No follow-ups on file.  Patient was given instructions and counseling regarding her condition or for health maintenance advice. Please see specific information pulled into the AVS if appropriate.       BMI is within normal parameters. No other follow-up for BMI required.   Answers submitted by the patient for this visit:  Other (Submitted on 3/11/2024)  Please describe your symptoms.: Testing follow up  Have you had these symptoms before?: No  How long have you  been having these symptoms?: Greater than 2 weeks  Please list any medications you are currently taking for this condition.: None  Please describe any probable cause for these symptoms. : None  Primary Reason for Visit (Submitted on 3/11/2024)  What is the primary reason for your visit?: Other

## 2024-03-15 LAB
ALBUMIN SERPL-MCNC: 4.3 G/DL (ref 3.8–4.9)
ALBUMIN/GLOB SERPL: 1.3 {RATIO} (ref 1.2–2.2)
ALP SERPL-CCNC: 70 IU/L (ref 44–121)
ALT SERPL-CCNC: 15 IU/L (ref 0–32)
AST SERPL-CCNC: 22 IU/L (ref 0–40)
BASOPHILS # BLD AUTO: 0.1 X10E3/UL (ref 0–0.2)
BASOPHILS NFR BLD AUTO: 1 %
BILIRUB SERPL-MCNC: 0.5 MG/DL (ref 0–1.2)
BUN SERPL-MCNC: 16 MG/DL (ref 6–24)
BUN/CREAT SERPL: 17 (ref 9–23)
CALCIUM SERPL-MCNC: 9.4 MG/DL (ref 8.7–10.2)
CHLORIDE SERPL-SCNC: 97 MMOL/L (ref 96–106)
CHOLEST SERPL-MCNC: 400 MG/DL (ref 100–199)
CHOLEST/HDLC SERPL: 8.2 RATIO (ref 0–4.4)
CO2 SERPL-SCNC: 22 MMOL/L (ref 20–29)
CREAT SERPL-MCNC: 0.95 MG/DL (ref 0.57–1)
EGFRCR SERPLBLD CKD-EPI 2021: 71 ML/MIN/1.73
EOSINOPHIL # BLD AUTO: 0.2 X10E3/UL (ref 0–0.4)
EOSINOPHIL NFR BLD AUTO: 4 %
ERYTHROCYTE [DISTWIDTH] IN BLOOD BY AUTOMATED COUNT: 13.5 % (ref 11.7–15.4)
GLOBULIN SER CALC-MCNC: 3.2 G/DL (ref 1.5–4.5)
GLUCOSE SERPL-MCNC: 88 MG/DL (ref 70–99)
HBA1C MFR BLD: 5.2 % (ref 4.8–5.6)
HCT VFR BLD AUTO: 45.4 % (ref 34–46.6)
HDLC SERPL-MCNC: 49 MG/DL
HGB BLD-MCNC: 15 G/DL (ref 11.1–15.9)
IMM GRANULOCYTES # BLD AUTO: 0.1 X10E3/UL (ref 0–0.1)
IMM GRANULOCYTES NFR BLD AUTO: 1 %
LABORATORY COMMENT REPORT: ABNORMAL
LDLC SERPL CALC-MCNC: 336 MG/DL (ref 0–99)
LYMPHOCYTES # BLD AUTO: 2.7 X10E3/UL (ref 0.7–3.1)
LYMPHOCYTES NFR BLD AUTO: 44 %
MCH RBC QN AUTO: 29.9 PG (ref 26.6–33)
MCHC RBC AUTO-ENTMCNC: 33 G/DL (ref 31.5–35.7)
MCV RBC AUTO: 91 FL (ref 79–97)
MONOCYTES # BLD AUTO: 0.5 X10E3/UL (ref 0.1–0.9)
MONOCYTES NFR BLD AUTO: 8 %
NEUTROPHILS # BLD AUTO: 2.6 X10E3/UL (ref 1.4–7)
NEUTROPHILS NFR BLD AUTO: 42 %
PLATELET # BLD AUTO: 271 X10E3/UL (ref 150–450)
POTASSIUM SERPL-SCNC: 4.7 MMOL/L (ref 3.5–5.2)
PROT SERPL-MCNC: 7.5 G/DL (ref 6–8.5)
RBC # BLD AUTO: 5.01 X10E6/UL (ref 3.77–5.28)
SODIUM SERPL-SCNC: 136 MMOL/L (ref 134–144)
TRIGL SERPL-MCNC: 94 MG/DL (ref 0–149)
TSH SERPL DL<=0.005 MIU/L-ACNC: 2.73 UIU/ML (ref 0.45–4.5)
VLDLC SERPL CALC-MCNC: 15 MG/DL (ref 5–40)
WBC # BLD AUTO: 6 X10E3/UL (ref 3.4–10.8)

## 2024-03-18 ENCOUNTER — OFFICE VISIT (OUTPATIENT)
Dept: FAMILY MEDICINE CLINIC | Facility: CLINIC | Age: 54
End: 2024-03-18
Payer: COMMERCIAL

## 2024-03-18 VITALS
HEART RATE: 103 BPM | WEIGHT: 131 LBS | BODY MASS INDEX: 22.36 KG/M2 | SYSTOLIC BLOOD PRESSURE: 122 MMHG | DIASTOLIC BLOOD PRESSURE: 82 MMHG | OXYGEN SATURATION: 98 % | TEMPERATURE: 97.7 F | RESPIRATION RATE: 18 BRPM | HEIGHT: 64 IN

## 2024-03-18 DIAGNOSIS — E78.5 DYSLIPIDEMIA WITH ELEVATED LOW DENSITY LIPOPROTEIN (LDL) CHOLESTEROL AND ABNORMALLY LOW HIGH DENSITY LIPOPROTEIN CHOLESTEROL: ICD-10-CM

## 2024-03-18 DIAGNOSIS — I10 HYPERTENSION, UNSPECIFIED TYPE: ICD-10-CM

## 2024-03-18 DIAGNOSIS — Z86.39 HISTORY OF HYPERGLYCEMIA: Primary | ICD-10-CM

## 2024-07-19 ENCOUNTER — OFFICE VISIT (OUTPATIENT)
Dept: FAMILY MEDICINE CLINIC | Facility: CLINIC | Age: 54
End: 2024-07-19
Payer: COMMERCIAL

## 2024-07-19 VITALS
WEIGHT: 130 LBS | BODY MASS INDEX: 20.4 KG/M2 | OXYGEN SATURATION: 99 % | HEART RATE: 84 BPM | DIASTOLIC BLOOD PRESSURE: 86 MMHG | HEIGHT: 67 IN | SYSTOLIC BLOOD PRESSURE: 122 MMHG | RESPIRATION RATE: 18 BRPM

## 2024-07-19 DIAGNOSIS — M67.471 GANGLION CYST OF RIGHT FOOT: Primary | ICD-10-CM

## 2024-07-19 PROCEDURE — 99213 OFFICE O/P EST LOW 20 MIN: CPT | Performed by: FAMILY MEDICINE

## 2024-07-19 NOTE — PROGRESS NOTES
Subjective   Nancy Marcano is a 53 y.o. female.   Chief Complaint   Patient presents with    Foot Swelling       History of Present Illness  Right dorsal foot swelling, ongoing for a few days, Pt thinks dog may have stepped on it  No other trauma. No numbness, a little sore        The following portions of the patient's history were reviewed and updated as appropriate: allergies, current medications, past family history, past medical history, past social history, past surgical history, and problem list.    Patient Active Problem List   Diagnosis    Chronic seasonal allergic rhinitis due to pollen    Dense breast    Dyslipidemia with elevated low density lipoprotein (LDL) cholesterol and abnormally low high density lipoprotein cholesterol    Gastroesophageal reflux disease without esophagitis    Hypertension    Mild intermittent asthma without complication    Class 1 obesity due to excess calories with serious comorbidity and body mass index (BMI) of 30.0 to 30.9 in adult    Annual visit for general adult medical examination with abnormal findings    Encounter for screening mammogram for malignant neoplasm of breast    Cervical cancer screening    Asymptomatic menopausal state    Colon cancer screening    Degenerative progressive high myopia    Epiretinal membrane    Posterior vitreous detachment    Pseudophakia    Primary osteoarthritis of right shoulder    Primary osteoarthritis involving multiple joints       Current Outpatient Medications on File Prior to Visit   Medication Sig Dispense Refill    acyclovir (ZOVIRAX) 800 MG tablet Take 1 tablet by mouth 5 (Five) Times a Day. 50 tablet 0    albuterol sulfate  (90 Base) MCG/ACT inhaler Inhale 2 puffs Every 4 (Four) Hours As Needed (every 4 hours as needed). 18 g 4    loratadine (CLARITIN REDITABS) 10 MG disintegrating tablet Take 1 tablet by mouth Daily.      Omega-3 Fatty Acids (FISH OIL) 1000 MG capsule capsule Take 1 capsule by mouth 2 (Two)  "Times a Day.       No current facility-administered medications on file prior to visit.     Current outpatient and discharge medications have been reconciled for the patient.  Reviewed by: Bhavesh Mark MD      No Known Allergies    Review of Systems   Constitutional:  Negative for activity change, appetite change, fatigue and fever.   HENT:  Negative for ear pain, swollen glands and voice change.    Eyes:  Negative for visual disturbance.   Respiratory:  Negative for shortness of breath and wheezing.    Cardiovascular:  Negative for chest pain and leg swelling.   Gastrointestinal:  Negative for abdominal pain, blood in stool, constipation, diarrhea, nausea and vomiting.   Endocrine: Negative for polydipsia and polyuria.   Genitourinary:  Negative for dysuria, frequency and hematuria.   Musculoskeletal:  Positive for arthralgias. Negative for joint swelling, neck pain and neck stiffness.   Skin:  Negative for rash and wound.   Neurological:  Negative for weakness, numbness and headache.   Psychiatric/Behavioral:  Negative for suicidal ideas and depressed mood.      I have reviewed and confirmed the accuracy of the ROS as documented by the MA/LPN/RN Bhavesh Mark MD    Objective   Visit Vitals  /86   Pulse 84   Resp 18   Ht 170.2 cm (67\")   Wt 59 kg (130 lb)   LMP 12/22/2020   SpO2 99%   BMI 20.36 kg/m²      **  Physical Exam  Musculoskeletal:        Feet:    Feet:      Comments: Ganglion. No bony tenderness, no skin color changes  No numbness, tingling, or evidence for neurovascular compromise.       Derm Physical Exam  Ortho Exam   Neurologic Exam       Diagnoses and all orders for this visit:    1. Ganglion cyst of right foot (Primary)     Findings discussed. All questions answered.  Differential diagnosis discussed.   Reassurance, education.  Natural course and self-limited nature of this condition discussed.    Try voltaren gel qam, ice I'm the evenings  Follow-up in 2 weeks if not " better.  Follow-up sooner for worsening symptoms or for any concerns.   May need imaging +/- podiatry or injection.  Follow-up for routine health maintenance as indicated.   BMI is within normal parameters. No other follow-up for BMI required.      Expected course, medications, and adverse effects discussed as appropriate.  Call or return if worsening or persistent symptoms.     This document is intended for medical professional use only.   Electronically signed by Bhavesh Mark MD on 07/19/2024. This content may not have been proofread.

## 2024-10-01 NOTE — PROGRESS NOTES
"Chief Complaint  Annual Exam and Establish Care (/)    Subjective          Nancy Marcano presents to [facility] for     HPI    Patient presents for annual wellness examination, to discuss health maintenance and disease prevention. Feels well. Activity level: vigorous. Diet: well balanced.    Patient  reports no history of alcohol use.     Tobacco Use: Low Risk  (10/2/2024)    Patient History     Smoking Tobacco Use: Never     Smokeless Tobacco Use: Never     Passive Exposure: Not on file       Cervical cancer screenin2020 Hysterectomy done     Breast cancer screenin2023    Osteoporosis screenin/10/2021 - normal bone mineral density.    Colorectal cancer screenin2021 cologuard     Influenza immunization was not given due to patient refusal.    Hypertension  Running normal at home.  114/70 in the office today.  No meds since significant weight loss.    Hyperlipidemia  Lab Results   Component Value Date    CHOL 180 2019    CHLPL 400 (H) 2024    TRIG 94 2024    HDL 49 2024     (H) 2024     Pt on carnivore diet.  Declines statin or other cholesterol-lowering therapy.    Objective   Vital Signs:   /70 (BP Location: Right arm, Patient Position: Sitting, Cuff Size: Adult)   Pulse 87   Temp 98.2 °F (36.8 °C) (Temporal)   Resp 18   Ht 165.1 cm (65\")   Wt 57.7 kg (127 lb 2 oz)   SpO2 97% Comment: room air  BMI 21.15 kg/m²     Physical Exam  Vitals and nursing note reviewed.   Constitutional:       General: She is not in acute distress.     Appearance: Normal appearance. She is normal weight. She is not ill-appearing or diaphoretic.   HENT:      Head: Normocephalic and atraumatic.      Nose: No congestion or rhinorrhea.   Eyes:      Extraocular Movements: Extraocular movements intact.      Pupils: Pupils are equal, round, and reactive to light.   Cardiovascular:      Rate and Rhythm: Normal rate and " regular rhythm.      Pulses: Normal pulses.   Pulmonary:      Effort: Pulmonary effort is normal.      Breath sounds: Normal breath sounds.   Musculoskeletal:         General: Normal range of motion.      Cervical back: Normal range of motion and neck supple.   Skin:     General: Skin is warm and dry.      Capillary Refill: Capillary refill takes less than 2 seconds.   Neurological:      Mental Status: She is alert and oriented to person, place, and time.   Psychiatric:         Mood and Affect: Mood normal.         Behavior: Behavior normal.        Result Review :                 Assessment and Plan    Diagnoses and all orders for this visit:    1. Annual visit for general adult medical examination with abnormal findings (Primary)  Overview:  Healthy Diet exercise wt loss, sunscreens seat belts, breast self exams and follow up in 1 yr or prn problems. Previous lab was discussed. We will notify her of today's lab      2. Hypertension, unspecified type  Overview:  Resolved since significant weight loss, off medications.    Assessment & Plan:  Stable off meds.      3. Colon cancer screening  Overview:  cologuard 03/21/2021 negative repeat 3 years.    Assessment & Plan:  Ordering cologuard today.    Orders:  -     Cologuard - Stool, Per Rectum; Future    4. Asymptomatic menopausal state  Overview:  S/p vaginal hystorectomy for menorrhagia and anemia, doing well   Last dexa scan 03/10/2021 normal.      Assessment & Plan:  Will repeat DEXA this year given significant weight loss and diet changes.    Orders:  -     DEXA Bone Density Axial    5. Cervical cancer screening  Overview:  S/p LATOSHA BSO 12/22/2020 heavy bleeding and anemia.  Last pap smear 05/29/2019 Dr. Davis negative      6. Encounter for screening mammogram for malignant neoplasm of breast  Overview:  Last mammogram 05/24/2023   03/10/2021   05/29/2019 Dr. Ryan Lagos Heterogeneously Dense    Assessment & Plan:  Annual mammogram ordered today.    Orders:  -      Mammo Screening Digital Tomosynthesis Bilateral With CAD; Future    7. Screening mammogram for breast cancer    8. Dyslipidemia with elevated low density lipoprotein (LDL) cholesterol and abnormally low high density lipoprotein cholesterol  Overview:  6/2024 - Significantly elevated total cholesterol and LDL:  Lab Results   Component Value Date    CHOL 180 03/23/2019    CHLPL 400 (H) 03/14/2024    TRIG 94 03/14/2024    HDL 49 03/14/2024     (H) 03/14/2024     Diet changes included carnivore diet, lots of high fat meat and lu. Stopped taking omega-3 supplements. Since this cholesterol result, has made some changes to diet including introducing vegetables and minimal carbs.    Assessment & Plan:  Patient declines initiation of statin or any other cholesterol medication.  Desires to remain on carnivore diet.  Advised to work to reduce fatty meats, increase leaner protein sources like poultry, more fish.  We will recheck a lipid panel today.    Orders:  -     Lipid Panel  -     Comprehensive Metabolic Panel         Follow Up     Cristian Capellan MD    NOTE: [employer], per Health Information accessibility laws, allows progress notes to be visible to patients. Please note that these notes will include professional medical terminology that may be somewhat confusing without some interpretation from your medical professional team. The intent of progress notes is to communicate information between any medical professionals involved in your case, or to serve as future reference for myself or any other provider when reviewing your case, as well as a reference for the patient viewing the record. Please ask a member of the medical team if you have any questions about terminology or content of note.    DISCLAIMER: This note was transcribed using a Dragon Medical voice recognition system. It may contain mistakes that may have not been recognized during proofreading. This note was also amended on the date signed due to  recognized errors.

## 2024-10-02 ENCOUNTER — OFFICE VISIT (OUTPATIENT)
Dept: FAMILY MEDICINE CLINIC | Facility: CLINIC | Age: 54
End: 2024-10-02
Payer: COMMERCIAL

## 2024-10-02 VITALS
DIASTOLIC BLOOD PRESSURE: 70 MMHG | TEMPERATURE: 98.2 F | SYSTOLIC BLOOD PRESSURE: 114 MMHG | OXYGEN SATURATION: 97 % | BODY MASS INDEX: 21.18 KG/M2 | WEIGHT: 127.13 LBS | HEIGHT: 65 IN | HEART RATE: 87 BPM | RESPIRATION RATE: 18 BRPM

## 2024-10-02 DIAGNOSIS — I10 HYPERTENSION, UNSPECIFIED TYPE: ICD-10-CM

## 2024-10-02 DIAGNOSIS — Z00.01 ANNUAL VISIT FOR GENERAL ADULT MEDICAL EXAMINATION WITH ABNORMAL FINDINGS: Primary | ICD-10-CM

## 2024-10-02 DIAGNOSIS — E78.5 DYSLIPIDEMIA WITH ELEVATED LOW DENSITY LIPOPROTEIN (LDL) CHOLESTEROL AND ABNORMALLY LOW HIGH DENSITY LIPOPROTEIN CHOLESTEROL: ICD-10-CM

## 2024-10-02 DIAGNOSIS — Z12.31 SCREENING MAMMOGRAM FOR BREAST CANCER: ICD-10-CM

## 2024-10-02 DIAGNOSIS — Z12.11 COLON CANCER SCREENING: ICD-10-CM

## 2024-10-02 DIAGNOSIS — Z12.31 ENCOUNTER FOR SCREENING MAMMOGRAM FOR MALIGNANT NEOPLASM OF BREAST: ICD-10-CM

## 2024-10-02 DIAGNOSIS — Z12.4 CERVICAL CANCER SCREENING: ICD-10-CM

## 2024-10-02 DIAGNOSIS — Z78.0 ASYMPTOMATIC MENOPAUSAL STATE: ICD-10-CM

## 2024-10-02 PROCEDURE — 99396 PREV VISIT EST AGE 40-64: CPT

## 2024-10-02 PROCEDURE — 99214 OFFICE O/P EST MOD 30 MIN: CPT

## 2024-10-02 NOTE — ASSESSMENT & PLAN NOTE
Patient declines initiation of statin or any other cholesterol medication.  Desires to remain on carnivore diet.  Advised to work to reduce fatty meats, increase leaner protein sources like poultry, more fish.  We will recheck a lipid panel today.

## 2024-10-03 LAB
ALBUMIN SERPL-MCNC: 4.8 G/DL (ref 3.8–4.9)
ALP SERPL-CCNC: 115 IU/L (ref 44–121)
ALT SERPL-CCNC: 17 IU/L (ref 0–32)
AST SERPL-CCNC: 18 IU/L (ref 0–40)
BILIRUB SERPL-MCNC: 0.6 MG/DL (ref 0–1.2)
BUN SERPL-MCNC: 22 MG/DL (ref 6–24)
BUN/CREAT SERPL: 22 (ref 9–23)
CALCIUM SERPL-MCNC: 10.4 MG/DL (ref 8.7–10.2)
CHLORIDE SERPL-SCNC: 97 MMOL/L (ref 96–106)
CHOLEST SERPL-MCNC: 422 MG/DL (ref 100–199)
CO2 SERPL-SCNC: 22 MMOL/L (ref 20–29)
CREAT SERPL-MCNC: 1.01 MG/DL (ref 0.57–1)
EGFRCR SERPLBLD CKD-EPI 2021: 66 ML/MIN/1.73
GLOBULIN SER CALC-MCNC: 3.2 G/DL (ref 1.5–4.5)
GLUCOSE SERPL-MCNC: 103 MG/DL (ref 70–99)
HDLC SERPL-MCNC: 78 MG/DL
LDL CALC COMMENT:: ABNORMAL
LDLC SERPL CALC-MCNC: 336 MG/DL (ref 0–99)
POTASSIUM SERPL-SCNC: 4.6 MMOL/L (ref 3.5–5.2)
PROT SERPL-MCNC: 8 G/DL (ref 6–8.5)
SODIUM SERPL-SCNC: 136 MMOL/L (ref 134–144)
TRIGL SERPL-MCNC: 71 MG/DL (ref 0–149)
VLDLC SERPL CALC-MCNC: 8 MG/DL (ref 5–40)

## 2024-10-04 ENCOUNTER — PATIENT MESSAGE (OUTPATIENT)
Dept: FAMILY MEDICINE CLINIC | Facility: CLINIC | Age: 54
End: 2024-10-04
Payer: COMMERCIAL

## 2024-10-04 DIAGNOSIS — E78.5 DYSLIPIDEMIA WITH ELEVATED LOW DENSITY LIPOPROTEIN (LDL) CHOLESTEROL AND ABNORMALLY LOW HIGH DENSITY LIPOPROTEIN CHOLESTEROL: Primary | ICD-10-CM

## 2024-10-23 ENCOUNTER — HOSPITAL ENCOUNTER (OUTPATIENT)
Dept: BONE DENSITY | Facility: HOSPITAL | Age: 54
Discharge: HOME | End: 2024-10-23
Payer: COMMERCIAL

## 2024-10-23 ENCOUNTER — HOSPITAL ENCOUNTER (OUTPATIENT)
Dept: MAMMOGRAPHY | Facility: HOSPITAL | Age: 54
Discharge: HOME | End: 2024-10-23
Payer: COMMERCIAL

## 2024-10-23 DIAGNOSIS — Z12.31 ENCOUNTER FOR SCREENING MAMMOGRAM FOR MALIGNANT NEOPLASM OF BREAST: ICD-10-CM

## 2024-10-23 PROCEDURE — 77063 BREAST TOMOSYNTHESIS BI: CPT

## 2024-10-23 PROCEDURE — 77067 SCR MAMMO BI INCL CAD: CPT

## 2024-10-23 PROCEDURE — 77080 DXA BONE DENSITY AXIAL: CPT

## 2024-10-25 DIAGNOSIS — M85.89 OSTEOPENIA OF MULTIPLE SITES: Primary | ICD-10-CM

## 2025-05-27 DIAGNOSIS — I10 HYPERTENSION, UNSPECIFIED TYPE: ICD-10-CM

## 2025-05-27 DIAGNOSIS — R73.03 PREDIABETES: ICD-10-CM

## 2025-05-27 DIAGNOSIS — E78.5 DYSLIPIDEMIA WITH ELEVATED LOW DENSITY LIPOPROTEIN (LDL) CHOLESTEROL AND ABNORMALLY LOW HIGH DENSITY LIPOPROTEIN CHOLESTEROL: ICD-10-CM

## 2025-05-27 DIAGNOSIS — Z00.01 ANNUAL VISIT FOR GENERAL ADULT MEDICAL EXAMINATION WITH ABNORMAL FINDINGS: Primary | ICD-10-CM

## 2025-06-01 LAB
CHOLEST SERPL-MCNC: 275 MG/DL (ref 100–199)
HDLC SERPL-MCNC: 87 MG/DL
LDLC SERPL CALC-MCNC: 177 MG/DL (ref 0–99)
TRIGL SERPL-MCNC: 69 MG/DL (ref 0–149)
VLDLC SERPL CALC-MCNC: 11 MG/DL (ref 5–40)

## 2025-06-04 NOTE — PROGRESS NOTES
"Chief Complaint  Annual Exam and Hand Pain    Subjective          Nancy Marcano presents to [facility] for     HPI    Patient presents for annual wellness examination, to discuss health maintenance and disease prevention. Feels well. Activity level: Moderate-vigorous . Diet: well balanced.    Patient reports no history of alcohol use.     Tobacco Use: Low Risk  (2025)    Patient History     Smoking Tobacco Use: Never     Smokeless Tobacco Use: Never     Passive Exposure: Not on file     Cervical cancer screenin2019 Dr. Davis S/P LATOSHA BSO 2020   Breast cancer screening: 10/23/2024  Osteoporosis screening: 10/23/2024 osteopenia  Colorectal cancer screening: Cologuard negative .    Left thumb pain, has been present for 2-3 years on and off, has progressed within the last few months. Feels it will \"catch\" when moving from flexion to extension.    Objective   Vital Signs:   /70 (BP Location: Right arm, Patient Position: Sitting, Cuff Size: Adult)   Pulse 111   Temp 98.4 °F (36.9 °C) (Temporal)   Resp 18   Ht 164.5 cm (64.75\")   Wt 62.3 kg (137 lb 6 oz)   SpO2 98% Comment: room air  BMI 23.04 kg/m²     Physical Exam  Vitals and nursing note reviewed.   Constitutional:       General: She is not in acute distress.     Appearance: Normal appearance. She is not ill-appearing or diaphoretic.   HENT:      Head: Normocephalic and atraumatic.      Right Ear: Tympanic membrane, ear canal and external ear normal.      Left Ear: Tympanic membrane, ear canal and external ear normal.      Nose: Congestion present.      Mouth/Throat:      Mouth: Mucous membranes are moist.      Pharynx: Oropharynx is clear. No oropharyngeal exudate or posterior oropharyngeal erythema.   Eyes:      Extraocular Movements: Extraocular movements intact.      Pupils: Pupils are equal, round, and reactive to light.   Cardiovascular:      Rate and Rhythm: Normal rate and regular " rhythm.      Pulses: Normal pulses.   Pulmonary:      Effort: Pulmonary effort is normal.      Breath sounds: Normal breath sounds.   Musculoskeletal:         General: Normal range of motion.      Cervical back: Normal range of motion and neck supple. No tenderness.   Lymphadenopathy:      Cervical: No cervical adenopathy.   Skin:     General: Skin is warm and dry.      Capillary Refill: Capillary refill takes less than 2 seconds.   Neurological:      Mental Status: She is alert and oriented to person, place, and time.      Cranial Nerves: No cranial nerve deficit.      Sensory: No sensory deficit.      Motor: No weakness.      Coordination: Coordination normal.      Gait: Gait normal.   Psychiatric:         Mood and Affect: Mood normal.         Behavior: Behavior normal.        Result Review :                 Assessment and Plan    Diagnoses and all orders for this visit:    1. Annual visit for general adult medical examination with abnormal findings (Primary)  Assessment & Plan:  Reviewed screenings as indicated above. Previous labs and documentation reviewed. We will notify of today's lab results as they become available. Discussed healthy diet and regular exercise. Counseled on self breast exams and general safety.      2. Encounter for screening mammogram for malignant neoplasm of breast  Assessment & Plan:  Continue annual mammogram.    Orders:  -     Mammo Screening Digital Tomosynthesis Bilateral With CAD; Future    3. Dyslipidemia with elevated low density lipoprotein (LDL) cholesterol and abnormally low high density lipoprotein cholesterol  Overview:  6/2025 - Significantly elevated total cholesterol and LDL:  Lab Results   Component Value Date    CHOL 180 03/23/2019    CHLPL 275 (H) 05/31/2025    TRIG 69 05/31/2025    HDL 87 05/31/2025     (H) 05/31/2025     Diet changes included carnivore diet, lots of high fat meat and lu. Stopped taking omega-3 supplements. Since this cholesterol result, has  made some changes to diet including introducing vegetables and minimal carbs.    Assessment & Plan:  Cholesterol has improved but remains rather elevated.  She continues to declines statin Rx.  We discussed further risk stratification with vascular screen and CT cardiac calcium score, she is agreeable and will get this done at [facility].      4. Trigger finger of left thumb  Comments:  Also suspect OA. Referral to Dr. Almodovar for further evaluation and management.  Orders:  -     Ambulatory Referral to Hand Surgery    5. Chronic seasonal allergic rhinitis due to pollen  Overview:  on antihistamines    Assessment & Plan:  Start intranasal steroid.  Counseled on importance of daily compliance and proper use technique.    Orders:  -     mometasone (NASONEX) 50 MCG/ACT nasal spray; Administer 2 sprays into the nostril(s) as directed by provider Daily.  Dispense: 17 g; Refill: 3           Cristian Capellan MD    NOTE: [email or web address], per Health Information accessibility laws, allows progress notes to be visible to patients. Please note that these notes will include professional medical terminology that may be somewhat confusing without some interpretation from your medical professional team. The intent of progress notes is to communicate information between any medical professionals involved in your case, or to serve as future reference for myself or any other provider when reviewing your case, as well as a reference for the patient viewing the record. Please ask a member of the medical team if you have any questions about terminology or content of note.    DISCLAIMER: Part of this note may be an electronic transcription/translation of spoken language to printed text using the Dragon Dictation System.

## 2025-06-09 ENCOUNTER — OFFICE VISIT (OUTPATIENT)
Dept: FAMILY MEDICINE CLINIC | Facility: CLINIC | Age: 55
End: 2025-06-09
Payer: COMMERCIAL

## 2025-06-09 VITALS
SYSTOLIC BLOOD PRESSURE: 124 MMHG | HEIGHT: 65 IN | WEIGHT: 137.38 LBS | DIASTOLIC BLOOD PRESSURE: 70 MMHG | RESPIRATION RATE: 18 BRPM | TEMPERATURE: 98.4 F | HEART RATE: 111 BPM | BODY MASS INDEX: 22.89 KG/M2 | OXYGEN SATURATION: 98 %

## 2025-06-09 DIAGNOSIS — J30.1 CHRONIC SEASONAL ALLERGIC RHINITIS DUE TO POLLEN: ICD-10-CM

## 2025-06-09 DIAGNOSIS — M65.312 TRIGGER FINGER OF LEFT THUMB: ICD-10-CM

## 2025-06-09 DIAGNOSIS — Z00.01 ANNUAL VISIT FOR GENERAL ADULT MEDICAL EXAMINATION WITH ABNORMAL FINDINGS: Primary | ICD-10-CM

## 2025-06-09 DIAGNOSIS — E78.5 DYSLIPIDEMIA WITH ELEVATED LOW DENSITY LIPOPROTEIN (LDL) CHOLESTEROL AND ABNORMALLY LOW HIGH DENSITY LIPOPROTEIN CHOLESTEROL: ICD-10-CM

## 2025-06-09 DIAGNOSIS — Z12.31 ENCOUNTER FOR SCREENING MAMMOGRAM FOR MALIGNANT NEOPLASM OF BREAST: ICD-10-CM

## 2025-06-09 RX ORDER — MOMETASONE FUROATE MONOHYDRATE 50 UG/1
2 SPRAY, METERED NASAL DAILY
Qty: 17 G | Refills: 3 | Status: SHIPPED | OUTPATIENT
Start: 2025-06-09

## 2025-06-09 NOTE — ASSESSMENT & PLAN NOTE
Cholesterol has improved but remains rather elevated.  She continues to declines statin Rx.  We discussed further risk stratification with vascular screen and CT cardiac calcium score, she is agreeable and will get this done at [facility].

## 2025-06-09 NOTE — ASSESSMENT & PLAN NOTE
Reviewed screenings as indicated above. Previous labs and documentation reviewed. We will notify of today's lab results as they become available. Discussed healthy diet and regular exercise. Counseled on self breast exams and general safety.

## 2025-07-28 NOTE — PROGRESS NOTES
"Chief Complaint  Vascular Screening follow up    Subjective          Nancy Marcano presents to [facility] for     HPI  Patient presents to clinic to discuss Vascular Screening results.     Vascular screening results are as follows:    1) Mild left carotid atherosclerotic disease.  2) No AAA.  3) Normal ABIs.  4) CT cardiac calcium score is 0.    Patient is asymptomatic.    Objective   Vital Signs:   /78 (BP Location: Right arm, Patient Position: Sitting, Cuff Size: Adult)   Pulse 103   Temp 98.2 °F (36.8 °C) (Temporal)   Resp 18   Ht 164.5 cm (64.75\")   Wt 60.3 kg (133 lb)   SpO2 98% Comment: room air  BMI 22.30 kg/m²     Physical Exam  Vitals and nursing note reviewed.   Constitutional:       General: She is not in acute distress.     Appearance: Normal appearance. She is not ill-appearing or diaphoretic.   HENT:      Head: Normocephalic and atraumatic.      Nose: No congestion or rhinorrhea.   Eyes:      Extraocular Movements: Extraocular movements intact.      Pupils: Pupils are equal, round, and reactive to light.   Neck:      Vascular: No carotid bruit.   Cardiovascular:      Rate and Rhythm: Normal rate and regular rhythm.      Pulses: Normal pulses.   Pulmonary:      Effort: Pulmonary effort is normal.      Breath sounds: Normal breath sounds.   Musculoskeletal:         General: Normal range of motion.      Cervical back: Normal range of motion and neck supple.   Skin:     General: Skin is warm and dry.      Capillary Refill: Capillary refill takes less than 2 seconds.   Neurological:      Mental Status: She is alert and oriented to person, place, and time.   Psychiatric:         Mood and Affect: Mood normal.         Behavior: Behavior normal.        Result Review :                 Assessment and Plan    Diagnoses and all orders for this visit:    1. Dyslipidemia with elevated low density lipoprotein (LDL) cholesterol and abnormally low high density " lipoprotein cholesterol (Primary)  Overview:  6/2025 - Significantly elevated total cholesterol and LDL:  Lab Results   Component Value Date    CHOL 180 03/23/2019    CHLPL 275 (H) 05/31/2025    TRIG 69 05/31/2025    HDL 87 05/31/2025     (H) 05/31/2025     CT cardiac calcium score 7/2025: 0.  Mnimal left carotid atherosclerotic disease.    Assessment & Plan:  Her vascular screening results were reviewed.  While her CT cardiac calcium score was 0, left carotid ultrasound did show minimal atherosclerotic disease.  She continues to decline statin Rx.  Her CT cardiac calcium score of 0 is reassuring but risks were reviewed.  She is agreeable to repeat vascular screening in a few years to ensure stability.  We will continue to monitor her cholesterol status closely.      2. Atherosclerosis of left carotid artery  Overview:  Minimal.  Incidentally seen on vascular screen 7/2025.           Cristian Capellan MD    NOTE: [employer], per Health Information accessibility laws, allows progress notes to be visible to patients. Please note that these notes will include professional medical terminology that may be somewhat confusing without some interpretation from your medical professional team. The intent of progress notes is to communicate information between any medical professionals involved in your case, or to serve as future reference for myself or any other provider when reviewing your case, as well as a reference for the patient viewing the record. Please ask a member of the medical team if you have any questions about terminology or content of note.    DISCLAIMER: Part of this note may be an electronic transcription/translation of spoken language to printed text using the Dragon Dictation System.

## 2025-07-29 ENCOUNTER — OFFICE VISIT (OUTPATIENT)
Dept: FAMILY MEDICINE CLINIC | Facility: CLINIC | Age: 55
End: 2025-07-29
Payer: COMMERCIAL

## 2025-07-29 VITALS
WEIGHT: 133 LBS | HEART RATE: 103 BPM | DIASTOLIC BLOOD PRESSURE: 78 MMHG | SYSTOLIC BLOOD PRESSURE: 126 MMHG | TEMPERATURE: 98.2 F | HEIGHT: 65 IN | OXYGEN SATURATION: 98 % | BODY MASS INDEX: 22.16 KG/M2 | RESPIRATION RATE: 18 BRPM

## 2025-07-29 DIAGNOSIS — I65.22 ATHEROSCLEROSIS OF LEFT CAROTID ARTERY: ICD-10-CM

## 2025-07-29 DIAGNOSIS — E78.5 DYSLIPIDEMIA WITH ELEVATED LOW DENSITY LIPOPROTEIN (LDL) CHOLESTEROL AND ABNORMALLY LOW HIGH DENSITY LIPOPROTEIN CHOLESTEROL: Primary | ICD-10-CM

## 2025-07-29 PROCEDURE — 99213 OFFICE O/P EST LOW 20 MIN: CPT
